# Patient Record
Sex: FEMALE | Race: WHITE | NOT HISPANIC OR LATINO | Employment: OTHER | URBAN - METROPOLITAN AREA
[De-identification: names, ages, dates, MRNs, and addresses within clinical notes are randomized per-mention and may not be internally consistent; named-entity substitution may affect disease eponyms.]

---

## 2018-12-13 ENCOUNTER — HOSPITAL ENCOUNTER (EMERGENCY)
Facility: HOSPITAL | Age: 38
Discharge: HOME | End: 2018-12-13
Attending: EMERGENCY MEDICINE
Payer: MEDICARE

## 2018-12-13 VITALS
DIASTOLIC BLOOD PRESSURE: 49 MMHG | OXYGEN SATURATION: 95 % | HEART RATE: 63 BPM | RESPIRATION RATE: 14 BRPM | SYSTOLIC BLOOD PRESSURE: 95 MMHG | WEIGHT: 197 LBS | TEMPERATURE: 100 F

## 2018-12-13 DIAGNOSIS — J06.9 VIRAL URI WITH COUGH: Primary | ICD-10-CM

## 2018-12-13 LAB
ALBUMIN SERPL BCP-MCNC: 2.9 G/DL
ALP SERPL-CCNC: 102 U/L
ALT SERPL W/O P-5'-P-CCNC: 18 U/L
ANION GAP SERPL CALC-SCNC: 7 MMOL/L
AST SERPL-CCNC: 21 U/L
BASOPHILS # BLD AUTO: 0 K/UL
BASOPHILS NFR BLD: 0.4 %
BILIRUB SERPL-MCNC: 0.3 MG/DL
BUN SERPL-MCNC: 13 MG/DL
CALCIUM SERPL-MCNC: 8.8 MG/DL
CHLORIDE SERPL-SCNC: 106 MMOL/L
CO2 SERPL-SCNC: 27 MMOL/L
CREAT SERPL-MCNC: 0.9 MG/DL
DIFFERENTIAL METHOD: ABNORMAL
EOSINOPHIL # BLD AUTO: 0 K/UL
EOSINOPHIL NFR BLD: 0.9 %
ERYTHROCYTE [DISTWIDTH] IN BLOOD BY AUTOMATED COUNT: 16.5 %
EST. GFR  (AFRICAN AMERICAN): >60 ML/MIN/1.73 M^2
EST. GFR  (NON AFRICAN AMERICAN): >60 ML/MIN/1.73 M^2
FLUAV AG SPEC QL IA: NEGATIVE
FLUBV AG SPEC QL IA: NEGATIVE
GLUCOSE SERPL-MCNC: 85 MG/DL
HCT VFR BLD AUTO: 39.2 %
HGB BLD-MCNC: 13 G/DL
LYMPHOCYTES # BLD AUTO: 0.9 K/UL
LYMPHOCYTES NFR BLD: 19.8 %
MCH RBC QN AUTO: 29 PG
MCHC RBC AUTO-ENTMCNC: 33.2 G/DL
MCV RBC AUTO: 87 FL
MONOCYTES # BLD AUTO: 0.4 K/UL
MONOCYTES NFR BLD: 8.9 %
NEUTROPHILS # BLD AUTO: 3.2 K/UL
NEUTROPHILS NFR BLD: 70 %
PLATELET # BLD AUTO: 201 K/UL
PMV BLD AUTO: 7.4 FL
POTASSIUM SERPL-SCNC: 3.8 MMOL/L
PROT SERPL-MCNC: 7.5 G/DL
RBC # BLD AUTO: 4.49 M/UL
SODIUM SERPL-SCNC: 140 MMOL/L
SPECIMEN SOURCE: NORMAL
WBC # BLD AUTO: 4.6 K/UL

## 2018-12-13 PROCEDURE — 87400 INFLUENZA A/B EACH AG IA: CPT | Mod: 59

## 2018-12-13 PROCEDURE — 85025 COMPLETE CBC W/AUTO DIFF WBC: CPT

## 2018-12-13 PROCEDURE — 96360 HYDRATION IV INFUSION INIT: CPT

## 2018-12-13 PROCEDURE — 99284 EMERGENCY DEPT VISIT MOD MDM: CPT | Mod: 25

## 2018-12-13 PROCEDURE — 80053 COMPREHEN METABOLIC PANEL: CPT

## 2018-12-13 PROCEDURE — 25000003 PHARM REV CODE 250: Performed by: NURSE PRACTITIONER

## 2018-12-13 PROCEDURE — 36415 COLL VENOUS BLD VENIPUNCTURE: CPT

## 2018-12-13 RX ORDER — CHOLECALCIFEROL (VITAMIN D3) 25 MCG
2000 TABLET ORAL DAILY
COMMUNITY
End: 2019-10-10

## 2018-12-13 RX ORDER — LEVOTHYROXINE SODIUM 100 UG/1
100 TABLET ORAL DAILY
COMMUNITY
End: 2019-10-29 | Stop reason: SDUPTHER

## 2018-12-13 RX ORDER — ROSUVASTATIN CALCIUM 10 MG/1
10 TABLET, COATED ORAL DAILY
COMMUNITY
End: 2019-10-10

## 2018-12-13 RX ORDER — GUAIFENESIN/DEXTROMETHORPHAN 100-10MG/5
5 SYRUP ORAL 4 TIMES DAILY PRN
Qty: 120 ML | Refills: 0 | Status: SHIPPED | OUTPATIENT
Start: 2018-12-13 | End: 2018-12-23

## 2018-12-13 RX ORDER — SODIUM CHLORIDE 9 MG/ML
1000 INJECTION, SOLUTION INTRAVENOUS
Status: COMPLETED | OUTPATIENT
Start: 2018-12-13 | End: 2018-12-13

## 2018-12-13 RX ORDER — ACETAMINOPHEN 500 MG
500 TABLET ORAL
Status: COMPLETED | OUTPATIENT
Start: 2018-12-13 | End: 2018-12-13

## 2018-12-13 RX ADMIN — ACETAMINOPHEN 500 MG: 500 TABLET ORAL at 09:12

## 2018-12-13 RX ADMIN — SODIUM CHLORIDE 1000 ML: 0.9 INJECTION, SOLUTION INTRAVENOUS at 09:12

## 2018-12-13 NOTE — ED PROVIDER NOTES
Encounter Date: 12/13/2018       History     Chief Complaint   Patient presents with    Cough     x 2 days     Patient is a 38 y.o. female who presents to the ED 12/13/2018 with a chief complaint of cough for 2 days.  Caregiver reports low-grade fever at home.  Caregiver reports patient has been eating and drinking well.  Caregiver reports patient appears slightly fatigued today and she was up coughing all night.  The patient denies any chest pain or shortness of breath. The caregiver reports concerns for influenza or pneumonia.  Patient denies any lower extremity swelling or history of heart disease.  Patient does have a history of Down syndrome, hypothyroidism, hyperlipidemia, and vitamin-D deficiency.               Review of patient's allergies indicates:  No Known Allergies  History reviewed. No pertinent past medical history.  History reviewed. No pertinent surgical history.  History reviewed. No pertinent family history.  Social History     Tobacco Use    Smoking status: Never Smoker   Substance Use Topics    Alcohol use: Not on file    Drug use: Not on file     Review of Systems   Constitutional: Negative for chills and fever.   HENT: Negative for sore throat.    Respiratory: Positive for cough. Negative for chest tightness and shortness of breath.    Cardiovascular: Negative for chest pain.   Gastrointestinal: Negative for abdominal pain.   Genitourinary: Negative for dysuria.   Musculoskeletal: Negative for arthralgias and myalgias.   Skin: Negative for rash and wound.   Neurological: Negative for syncope.   Hematological: Does not bruise/bleed easily.       Physical Exam     Initial Vitals [12/13/18 0803]   BP Pulse Resp Temp SpO2   (!) 107/57 88 14 99.8 °F (37.7 °C) 96 %      MAP       --         Physical Exam    Nursing note and vitals reviewed.  Constitutional: Vital signs are normal. She appears well-developed and well-nourished.   HENT:   Head: Normocephalic and atraumatic.   Eyes: Pupils are  equal, round, and reactive to light.   Neck: Neck supple.   Cardiovascular: Normal rate, regular rhythm, normal heart sounds and intact distal pulses. Exam reveals no gallop and no friction rub.    No murmur heard.  Pulmonary/Chest: She has no wheezes. She has no rhonchi. She has rales (left lower lobe).   Abdominal: Normal appearance.   Neurological: She is alert and oriented to person, place, and time. She has normal strength.   Skin: Skin is warm, dry and intact.   Psychiatric: She has a normal mood and affect. Her speech is normal and behavior is normal.         ED Course   Procedures  Labs Reviewed   CBC W/ AUTO DIFFERENTIAL - Abnormal; Notable for the following components:       Result Value    RDW 16.5 (*)     MPV 7.4 (*)     Lymph # 0.9 (*)     All other components within normal limits   COMPREHENSIVE METABOLIC PANEL - Abnormal; Notable for the following components:    Albumin 2.9 (*)     Anion Gap 7 (*)     All other components within normal limits   INFLUENZA A AND B ANTIGEN          Imaging Results          X-Ray Chest PA And Lateral (Final result)  Result time 12/13/18 08:53:57    Final result by Johana Spencer MD (12/13/18 08:53:57)                 Impression:      No acute abnormality.      Electronically signed by: Johana Spencer MD  Date:    12/13/2018  Time:    08:53             Narrative:    EXAMINATION:  XR CHEST PA AND LATERAL    CLINICAL HISTORY:  Cough;    TECHNIQUE:  PA and lateral views of the chest were performed.    COMPARISON:  None    FINDINGS:  The lungs are clear, with normal appearance of pulmonary vasculature and no pleural effusion or pneumothorax.    The cardiac silhouette is normal in size. The hilar and mediastinal contours are unremarkable.    Bones are intact.                                 Medical Decision Making:   Differential Diagnosis:   Viral URI  Pneumonia  Influenza       APC / Resident Notes:   Patient is a 38 y.o. female who presents to the ED 12/13/2018 who  underwent emergent evaluation for cough for 2 days.  The patient appears to have a viral upper respiratory infection.  Based upon the history and physical exam the patient does not appear to have a serious bacterial infection such as pneumonia, sepsis, otitis media, bacterial sinusitis, strep pharyngitis, parapharyngeal or peritonsillar abscess, meningitis.  CXR is without acute findings; I do not think pneumonia or bacterial bronchitis. I do not think sepsis. Influenza testing in the ED is negative; I do not think influenza.    Patient appears very well and I have given specific return precautions to the patient and/or family members.  The patient can take over the counter medications and does not appear to need antibiotics at this time. Based on my clinical evaluation, I do not appreciate any immediate, emergent, or life threatening condition or etiology that warrants additional workup today and feel that the patient can be discharged with close follow up care. Case discussed with Dr. Andrade who is agreeable to plan of care. Follow up and return precautions discussed; patient and caregiver verbalized understanding and is agreeable to plan of care. Patient discharged home in stable condition.                   Attending Attestation:     Physician Attestation Statement for NP/PA:   I discussed this assessment and plan of this patient with the NP/PA, but I did not personally examine the patient. The face to face encounter was performed by the NP/PA.    Other NP/PA Attestation Additions:    History of Present Illness: 38-year-old female presented with a chief complaint of a cough.    Medical Decision Making: Initial differential diagnosis included but not limited to pneumonia, bronchitis, and upper respiratory infection.  I am in agreement with the nurse practitioner's assessment, treatment, and plan of care.       Physician Attestation for Scribe:  Physician Attestation Statement for Scribe #1: Meseret DUARTE,  reviewed documentation, as scribed by in my presence, and it is both accurate and complete.     Comments: I, LUIS Hernandez, personally performed the services described in this documentation. All medical record entries made by the scribe were at my direction and in my presence.  I have reviewed the chart and agree that the record reflects my personal performance and is accurate and complete. LUIS Hernandez.  4:30 PM 12/13/2018     I, Dr. Bradford Andrade, personally performed the services described in this documentation. All medical record entries made by the scribe were at my direction and in my presence.  I have reviewed the chart and agree that the record reflects my personal performance and is accurate and complete. Bradford Andrade MD.  4:38 PM 12/13/2018             Clinical Impression:   The encounter diagnosis was Viral URI with cough.      Disposition:   Disposition: Discharged  Condition: Stable                        Meseret Gates NP  12/13/18 1630       Bradford Andrade MD  12/13/18 0142

## 2019-10-10 ENCOUNTER — TELEPHONE (OUTPATIENT)
Dept: FAMILY MEDICINE | Facility: CLINIC | Age: 39
End: 2019-10-10

## 2019-10-10 ENCOUNTER — OFFICE VISIT (OUTPATIENT)
Dept: FAMILY MEDICINE | Facility: CLINIC | Age: 39
End: 2019-10-10
Payer: MEDICARE

## 2019-10-10 VITALS
HEART RATE: 68 BPM | TEMPERATURE: 98 F | WEIGHT: 215 LBS | BODY MASS INDEX: 45.13 KG/M2 | DIASTOLIC BLOOD PRESSURE: 70 MMHG | HEIGHT: 58 IN | SYSTOLIC BLOOD PRESSURE: 98 MMHG

## 2019-10-10 DIAGNOSIS — L73.9 FOLLICULITIS: Primary | ICD-10-CM

## 2019-10-10 DIAGNOSIS — Q90.9 DOWN SYNDROME: ICD-10-CM

## 2019-10-10 PROCEDURE — 99213 PR OFFICE/OUTPT VISIT, EST, LEVL III, 20-29 MIN: ICD-10-PCS | Mod: S$GLB,,, | Performed by: FAMILY MEDICINE

## 2019-10-10 PROCEDURE — 99213 OFFICE O/P EST LOW 20 MIN: CPT | Mod: S$GLB,,, | Performed by: FAMILY MEDICINE

## 2019-10-10 RX ORDER — ROSUVASTATIN CALCIUM 20 MG/1
1 TABLET, COATED ORAL DAILY
Refills: 3 | COMMUNITY
Start: 2019-08-13 | End: 2020-08-18 | Stop reason: SDUPTHER

## 2019-10-10 RX ORDER — TRIAMCINOLONE ACETONIDE 5 MG/G
1 OINTMENT TOPICAL 2 TIMES DAILY
COMMUNITY
End: 2020-04-28 | Stop reason: SDUPTHER

## 2019-10-10 RX ORDER — LORATADINE 10 MG/1
1 TABLET ORAL DAILY
COMMUNITY

## 2019-10-10 RX ORDER — DOXYCYCLINE 100 MG/1
100 CAPSULE ORAL 2 TIMES DAILY
Qty: 20 CAPSULE | Refills: 0 | Status: SHIPPED | OUTPATIENT
Start: 2019-10-10 | End: 2019-10-20

## 2019-10-10 RX ORDER — FLUTICASONE PROPIONATE 50 MCG
1 SPRAY, SUSPENSION (ML) NASAL 2 TIMES DAILY
COMMUNITY
End: 2020-11-12 | Stop reason: SDUPTHER

## 2019-10-10 RX ORDER — MUPIROCIN 20 MG/G
OINTMENT TOPICAL 2 TIMES DAILY
Qty: 30 G | Refills: 3 | Status: SHIPPED | OUTPATIENT
Start: 2019-10-10 | End: 2020-11-12 | Stop reason: SDUPTHER

## 2019-10-10 RX ORDER — MUPIROCIN 20 MG/G
1 OINTMENT TOPICAL 2 TIMES DAILY
COMMUNITY
Start: 2016-02-02 | End: 2019-10-10 | Stop reason: SDUPTHER

## 2019-10-10 RX ORDER — ACETAMINOPHEN 500 MG
1 TABLET ORAL DAILY
COMMUNITY

## 2019-10-10 NOTE — TELEPHONE ENCOUNTER
----- Message from Lizz Sanchez sent at 10/10/2019  9:03 AM CDT -----  Contact: Gloria patient's mother  Boil under her breast has ruptured and she thinks it has turned into a staff infection now, can dr. Mariee possibly see her today??   Gloria's# [phone]

## 2019-10-11 NOTE — PROGRESS NOTES
SUBJECTIVE:    Patient ID: Catalina Lovett is a 39 y.o. female.    Chief Complaint: boils under breast    Patient with history of Down syndrome and hypothyroidism is brought in by her mother with complaints of boils under her breasts.  Patient has had previous problems with intertrigo and folliculitis as well as eczema.  She uses Bactroban ointment periodically which she has some irritation.  Mother reports that 2 days ago she noted several red lesions under her breasts that were draining pus.  Patient does not complain much of pain.  She has been compliant with her other medications.      Past Medical History:   Diagnosis Date    Down's syndrome     Hyperlipidemia     Hypothyroidism      Past Surgical History:   Procedure Laterality Date    TEAR DUCT SURGERY       Family History   Problem Relation Age of Onset    Hypertension Mother     Diabetes Mother        Marital Status: Single  Alcohol History:  reports that she does not drink alcohol.  Tobacco History:  reports that she has never smoked. She has never used smokeless tobacco.  Drug History:  reports that she does not use drugs.    Review of patient's allergies indicates:  No Known Allergies    Current Outpatient Medications:     mupirocin (BACTROBAN) 2 % ointment, Apply topically 2 (two) times daily., Disp: 30 g, Rfl: 3    cholecalciferol, vitamin D3, (VITAMIN D3) 2,000 unit Cap, Take 1 capsule by mouth Daily., Disp: , Rfl:     doxycycline (VIBRAMYCIN) 100 MG Cap, Take 1 capsule (100 mg total) by mouth 2 (two) times daily. for 10 days, Disp: 20 capsule, Rfl: 0    fluticasone propionate (FLONASE) 50 mcg/actuation nasal spray, 1 spray by Each Nostril route 2 (two) times daily., Disp: , Rfl:     levothyroxine (SYNTHROID) 100 MCG tablet, Take 100 mcg by mouth once daily., Disp: , Rfl:     loratadine (CLARITIN) 10 mg tablet, Take 1 tablet by mouth Daily., Disp: , Rfl:     rosuvastatin (CRESTOR) 20 MG tablet, Take 1 tablet by mouth Daily., Disp: ,  "Rfl: 3    triamcinolone (KENALOG) 0.5 % ointment, Apply 1 application topically 2 (two) times daily. For rash, Disp: , Rfl:     Review of Systems   Constitutional: Negative for activity change, fatigue and unexpected weight change.   HENT: Negative for hearing loss, postnasal drip, sinus pressure, sore throat and voice change.    Eyes: Negative for photophobia and visual disturbance.   Respiratory: Negative for cough, shortness of breath and wheezing.    Cardiovascular: Negative for chest pain and palpitations.   Gastrointestinal: Negative for constipation, diarrhea and nausea.   Genitourinary: Negative for difficulty urinating, frequency, hematuria and urgency.   Musculoskeletal: Negative for arthralgias and back pain.   Neurological: Negative for weakness, light-headedness and headaches.   Hematological: Negative for adenopathy. Does not bruise/bleed easily.   Psychiatric/Behavioral: The patient is not nervous/anxious.           Objective:      Vitals:    10/10/19 1654   BP: 98/70   Pulse: 68   Temp: 98 °F (36.7 °C)   Weight: 97.5 kg (215 lb)   Height: 4' 9.5" (1.461 m)     Physical Exam   Constitutional: She appears well-developed and well-nourished. No distress.   Overweight   HENT:   Down's facies   Eyes: Pupils are equal, round, and reactive to light. EOM are normal.   Cardiovascular: Normal rate.   Pulmonary/Chest: Effort normal and breath sounds normal.   Abdominal: Bowel sounds are normal.   Skin:   Erythematous pustules and different stages under bilateral breasts.  Right worse than left.   Vitals reviewed.        Assessment:       1. Folliculitis    2. Down syndrome         Plan:       Folliculitis  -     mupirocin (BACTROBAN) 2 % ointment; Apply topically 2 (two) times daily.  Dispense: 30 g; Refill: 3  -     doxycycline (VIBRAMYCIN) 100 MG Cap; Take 1 capsule (100 mg total) by mouth 2 (two) times daily. for 10 days  Dispense: 20 capsule; Refill: 0    Down syndrome     Known care instructions provided " to mother.  Patient also to apply Bactroban ointment inside her nose x5 days to reduce bacterial counts.  Follow up if symptoms worsen or fail to improve.

## 2019-10-29 ENCOUNTER — CLINICAL SUPPORT (OUTPATIENT)
Dept: FAMILY MEDICINE | Facility: CLINIC | Age: 39
End: 2019-10-29
Payer: MEDICARE

## 2019-10-29 VITALS — TEMPERATURE: 98 F

## 2019-10-29 DIAGNOSIS — Z23 FLU VACCINE NEED: Primary | ICD-10-CM

## 2019-10-29 DIAGNOSIS — E03.9 HYPOTHYROIDISM, UNSPECIFIED TYPE: Primary | ICD-10-CM

## 2019-10-29 PROCEDURE — G0008 FLU VACCINE - QUADRIVALENT (RECOMBINANT) PRESERVATIVE FREE: ICD-10-PCS | Mod: S$GLB,,, | Performed by: FAMILY MEDICINE

## 2019-10-29 PROCEDURE — G0008 ADMIN INFLUENZA VIRUS VAC: HCPCS | Mod: S$GLB,,, | Performed by: FAMILY MEDICINE

## 2019-10-29 PROCEDURE — 90682 FLU VACCINE - QUADRIVALENT (RECOMBINANT) PRESERVATIVE FREE: ICD-10-PCS | Mod: S$GLB,,, | Performed by: FAMILY MEDICINE

## 2019-10-29 PROCEDURE — 90682 RIV4 VACC RECOMBINANT DNA IM: CPT | Mod: S$GLB,,, | Performed by: FAMILY MEDICINE

## 2019-10-29 RX ORDER — LEVOTHYROXINE SODIUM 100 UG/1
100 TABLET ORAL DAILY
Qty: 90 TABLET | Refills: 1 | Status: SHIPPED | OUTPATIENT
Start: 2019-10-29 | End: 2020-04-08 | Stop reason: SDUPTHER

## 2019-10-29 NOTE — TELEPHONE ENCOUNTER
----- Message from Steff Bello sent at 10/29/2019 11:35 AM CDT -----  Contact: Gloria pts mom   Refill levothyroxine. She is almost out. [facility] on [address]. Gloria # [phone] GH

## 2019-10-30 LAB — TSH SERPL-ACNC: 1.22 MIU/L

## 2019-11-05 ENCOUNTER — OFFICE VISIT (OUTPATIENT)
Dept: FAMILY MEDICINE | Facility: CLINIC | Age: 39
End: 2019-11-05
Payer: MEDICARE

## 2019-11-05 VITALS
WEIGHT: 214 LBS | SYSTOLIC BLOOD PRESSURE: 106 MMHG | HEIGHT: 57 IN | DIASTOLIC BLOOD PRESSURE: 66 MMHG | HEART RATE: 72 BPM | BODY MASS INDEX: 46.17 KG/M2

## 2019-11-05 DIAGNOSIS — L20.84 INTRINSIC ECZEMA: ICD-10-CM

## 2019-11-05 DIAGNOSIS — Q90.9 DOWN SYNDROME: ICD-10-CM

## 2019-11-05 DIAGNOSIS — E03.9 ACQUIRED HYPOTHYROIDISM: Primary | ICD-10-CM

## 2019-11-05 DIAGNOSIS — E55.9 VITAMIN D DEFICIENCY: ICD-10-CM

## 2019-11-05 DIAGNOSIS — E78.49 OTHER HYPERLIPIDEMIA: ICD-10-CM

## 2019-11-05 PROCEDURE — 99214 PR OFFICE/OUTPT VISIT, EST, LEVL IV, 30-39 MIN: ICD-10-PCS | Mod: S$GLB,,, | Performed by: FAMILY MEDICINE

## 2019-11-05 PROCEDURE — 99214 OFFICE O/P EST MOD 30 MIN: CPT | Mod: S$GLB,,, | Performed by: FAMILY MEDICINE

## 2019-11-05 NOTE — PROGRESS NOTES
Dictation #1  MRN:[id number]  [facility]:[id number]    SUBJECTIVE:    Patient ID: Catalina Lovett is a 39 y.o. female.    Chief Complaint: Regular Check Up    Patient with hypothyroidism, hyperlipidemia and Down syndrome is brought in by her mother for regular visit.  She has been doing well on current medications.  On last check her TSH was slightly abnormal dosing was not changed.  TSH is normal today.  Blood pressure is good.  Some weight gain is noted. Patient does have a habit of over eating.  She has some trouble with eczema at the nail beds and frequently picks at her nail beds and gets infection.  No infections currently but does have irritation.    Orders Only on 10/29/2019   Component Date Value Ref Range Status    TSH w/reflex to FT4 10/29/2019 1.22  mIU/L Final     Past Medical History:   Diagnosis Date    Down's syndrome     Hyperlipidemia     Hypothyroidism      Past Surgical History:   Procedure Laterality Date    TEAR DUCT SURGERY       Family History   Problem Relation Age of Onset    Hypertension Mother     Diabetes Mother        Marital Status: Single  Alcohol History:  reports that she does not drink alcohol.  Tobacco History:  reports that she has never smoked. She has never used smokeless tobacco.  Drug History:  reports that she does not use drugs.    Review of patient's allergies indicates:  No Known Allergies    Current Outpatient Medications:     cholecalciferol, vitamin D3, (VITAMIN D3) 2,000 unit Cap, Take 1 capsule by mouth Daily., Disp: , Rfl:     fluticasone propionate (FLONASE) 50 mcg/actuation nasal spray, 1 spray by Each Nostril route 2 (two) times daily., Disp: , Rfl:     levothyroxine (SYNTHROID) 100 MCG tablet, Take 1 tablet (100 mcg total) by mouth once daily., Disp: 90 tablet, Rfl: 1    loratadine (CLARITIN) 10 mg tablet, Take 1 tablet by mouth Daily., Disp: , Rfl:     mupirocin (BACTROBAN) 2 % ointment, Apply topically 2 (two) times daily., Disp: 30 g, Rfl: 3     "rosuvastatin (CRESTOR) 20 MG tablet, Take 1 tablet by mouth Daily., Disp: , Rfl: 3    triamcinolone (KENALOG) 0.5 % ointment, Apply 1 application topically 2 (two) times daily. For rash, Disp: , Rfl:     Review of Systems   Constitutional: Negative for activity change, fatigue and unexpected weight change.   HENT: Negative for hearing loss, postnasal drip, sinus pressure, sore throat and voice change.    Eyes: Negative for photophobia and visual disturbance.   Respiratory: Negative for cough, shortness of breath and wheezing.    Cardiovascular: Negative for chest pain and palpitations.   Gastrointestinal: Negative for constipation, diarrhea and nausea.   Genitourinary: Negative for difficulty urinating, frequency, hematuria and urgency.   Musculoskeletal: Negative for arthralgias and back pain.   Skin: Negative for rash.   Neurological: Negative for weakness, light-headedness and headaches.   Hematological: Negative for adenopathy. Does not bruise/bleed easily.   Psychiatric/Behavioral: The patient is not nervous/anxious.           Objective:      Vitals:    11/05/19 0847   BP: 106/66   Pulse: 72   Weight: 97.1 kg (214 lb)   Height: 4' 9" (1.448 m)     Physical Exam   Constitutional: She is oriented to person, place, and time. Vital signs are normal. She appears well-developed and well-nourished. No distress.   Down's features   HENT:   Head: Normocephalic and atraumatic.   Right Ear: Tympanic membrane and external ear normal.   Left Ear: Tympanic membrane and external ear normal.   Eyes: Pupils are equal, round, and reactive to light. Conjunctivae, EOM and lids are normal.   Neck: Full passive range of motion without pain. Neck supple. No JVD present. No tracheal deviation present. No thyromegaly present.   Cardiovascular: Normal rate and regular rhythm. PMI is not displaced.   Pulmonary/Chest: Effort normal and breath sounds normal.   Abdominal: Soft. Bowel sounds are normal. There is no hepatosplenomegaly. There " is no tenderness. There is no rebound and no guarding.   Musculoskeletal: Normal range of motion. She exhibits no edema or tenderness.   Neurological: She is alert and oriented to person, place, and time.   Skin: Skin is warm and dry. No rash noted.   Dryness and scaling and nail beds of bilateral fingers   Psychiatric: She has a normal mood and affect.   Vitals reviewed.        Assessment:       1. Acquired hypothyroidism    2. Other hyperlipidemia    3. Down syndrome    4. Vitamin D deficiency    5. Intrinsic eczema         Plan:       Acquired hypothyroidism  -     CBC auto differential; Future; Expected date: 04/01/2020  -     TSH w/reflex to FT4; Future; Expected date: 04/01/2020    Other hyperlipidemia  -     Comprehensive metabolic panel; Future; Expected date: 04/01/2020  -     Lipid panel; Future; Expected date: 04/01/2020    Down syndrome  -     Urinalysis, Reflex to Urine Culture Urine, Clean Catch; Future; Expected date: 04/01/2020    Vitamin D deficiency  -     Vitamin D; Future; Expected date: 04/01/2020    Intrinsic eczema      No follow-ups on file.

## 2020-04-08 DIAGNOSIS — E03.9 HYPOTHYROIDISM, UNSPECIFIED TYPE: ICD-10-CM

## 2020-04-08 RX ORDER — LEVOTHYROXINE SODIUM 100 UG/1
100 TABLET ORAL DAILY
Qty: 90 TABLET | Refills: 1 | Status: SHIPPED | OUTPATIENT
Start: 2020-04-08 | End: 2020-10-30 | Stop reason: SDUPTHER

## 2020-04-08 NOTE — TELEPHONE ENCOUNTER
----- Message from Nina Guthrie sent at 4/8/2020  4:19 PM CDT -----  Contact: Gloria  Refill for levothyroxine. [facility]. Gloria @[phone]

## 2020-04-28 ENCOUNTER — TELEPHONE (OUTPATIENT)
Dept: FAMILY MEDICINE | Facility: CLINIC | Age: 40
End: 2020-04-28

## 2020-04-28 DIAGNOSIS — L73.9 FOLLICULITIS: ICD-10-CM

## 2020-04-28 NOTE — TELEPHONE ENCOUNTER
LMOR that fasting lab is due prior to ov, if she feels comfortable, and that orders are at [facility] and she would need to go to [address] [facility].

## 2020-04-28 NOTE — TELEPHONE ENCOUNTER
----- Message from Sterling Sanchez sent at 4/28/2020  3:07 PM CDT -----  Refills on ointment for fingers   [facility]   Pt [phone]

## 2020-04-29 RX ORDER — TRIAMCINOLONE ACETONIDE 5 MG/G
1 OINTMENT TOPICAL 2 TIMES DAILY
Qty: 30 G | Refills: 1 | Status: SHIPPED | OUTPATIENT
Start: 2020-04-29 | End: 2021-06-01 | Stop reason: SDUPTHER

## 2020-06-03 ENCOUNTER — PES CALL (OUTPATIENT)
Dept: ADMINISTRATIVE | Facility: CLINIC | Age: 40
End: 2020-06-03

## 2020-06-26 ENCOUNTER — PES CALL (OUTPATIENT)
Dept: ADMINISTRATIVE | Facility: CLINIC | Age: 40
End: 2020-06-26

## 2020-07-15 LAB
25(OH)D3 SERPL-MCNC: 41 NG/ML (ref 30–100)
ALBUMIN SERPL-MCNC: 3.6 G/DL (ref 3.6–5.1)
ALBUMIN/GLOB SERPL: 1 (CALC) (ref 1–2.5)
ALP SERPL-CCNC: 106 U/L (ref 31–125)
ALT SERPL-CCNC: 12 U/L (ref 6–29)
APPEARANCE UR: CLEAR
AST SERPL-CCNC: 17 U/L (ref 10–30)
BACTERIA #/AREA URNS HPF: NORMAL /HPF
BACTERIA UR CULT: NORMAL
BASOPHILS # BLD AUTO: 62 CELLS/UL (ref 0–200)
BASOPHILS NFR BLD AUTO: 1.2 %
BILIRUB SERPL-MCNC: 0.4 MG/DL (ref 0.2–1.2)
BILIRUB UR QL STRIP: NEGATIVE
BUN SERPL-MCNC: 13 MG/DL (ref 7–25)
BUN/CREAT SERPL: NORMAL (CALC) (ref 6–22)
CALCIUM SERPL-MCNC: 8.8 MG/DL (ref 8.6–10.2)
CHLORIDE SERPL-SCNC: 106 MMOL/L (ref 98–110)
CHOLEST SERPL-MCNC: 155 MG/DL
CHOLEST/HDLC SERPL: 3.4 (CALC)
CO2 SERPL-SCNC: 29 MMOL/L (ref 20–32)
COLOR UR: YELLOW
CREAT SERPL-MCNC: 1.07 MG/DL (ref 0.5–1.1)
EOSINOPHIL # BLD AUTO: 31 CELLS/UL (ref 15–500)
EOSINOPHIL NFR BLD AUTO: 0.6 %
ERYTHROCYTE [DISTWIDTH] IN BLOOD BY AUTOMATED COUNT: 15.2 % (ref 11–15)
GFRSERPLBLD MDRD-ARVRAT: 65 ML/MIN/1.73M2
GLOBULIN SER CALC-MCNC: 3.7 G/DL (CALC) (ref 1.9–3.7)
GLUCOSE SERPL-MCNC: 83 MG/DL (ref 65–99)
GLUCOSE UR QL STRIP: NEGATIVE
HCT VFR BLD AUTO: 44.2 % (ref 35–45)
HDLC SERPL-MCNC: 45 MG/DL
HGB BLD-MCNC: 13.8 G/DL (ref 11.7–15.5)
HGB UR QL STRIP: NEGATIVE
HYALINE CASTS #/AREA URNS LPF: NORMAL /LPF
KETONES UR QL STRIP: NEGATIVE
LDLC SERPL CALC-MCNC: 92 MG/DL (CALC)
LEUKOCYTE ESTERASE UR QL STRIP: NEGATIVE
LYMPHOCYTES # BLD AUTO: 1612 CELLS/UL (ref 850–3900)
LYMPHOCYTES NFR BLD AUTO: 31 %
MCH RBC QN AUTO: 28.4 PG (ref 27–33)
MCHC RBC AUTO-ENTMCNC: 31.2 G/DL (ref 32–36)
MCV RBC AUTO: 90.9 FL (ref 80–100)
MONOCYTES # BLD AUTO: 385 CELLS/UL (ref 200–950)
MONOCYTES NFR BLD AUTO: 7.4 %
NEUTROPHILS # BLD AUTO: 3110 CELLS/UL (ref 1500–7800)
NEUTROPHILS NFR BLD AUTO: 59.8 %
NITRITE UR QL STRIP: NEGATIVE
NONHDLC SERPL-MCNC: 110 MG/DL (CALC)
PH UR STRIP: NORMAL [PH] (ref 5–8)
PLATELET # BLD AUTO: 228 THOUSAND/UL (ref 140–400)
PMV BLD REES-ECKER: 9.8 FL (ref 7.5–12.5)
POTASSIUM SERPL-SCNC: 4.3 MMOL/L (ref 3.5–5.3)
PROT SERPL-MCNC: 7.3 G/DL (ref 6.1–8.1)
PROT UR QL STRIP: NEGATIVE
RBC # BLD AUTO: 4.86 MILLION/UL (ref 3.8–5.1)
RBC #/AREA URNS HPF: NORMAL /HPF
SODIUM SERPL-SCNC: 141 MMOL/L (ref 135–146)
SP GR UR STRIP: 1.02 (ref 1–1.03)
SQUAMOUS #/AREA URNS HPF: NORMAL /HPF
TRIGL SERPL-MCNC: 85 MG/DL
TSH SERPL-ACNC: 2.18 MIU/L
WBC # BLD AUTO: 5.2 THOUSAND/UL (ref 3.8–10.8)
WBC #/AREA URNS HPF: NORMAL /HPF

## 2020-08-06 ENCOUNTER — TELEPHONE (OUTPATIENT)
Dept: FAMILY MEDICINE | Facility: CLINIC | Age: 40
End: 2020-08-06

## 2020-08-06 NOTE — TELEPHONE ENCOUNTER
----- Message from Sterling Sanchez sent at 8/6/2020  9:07 AM CDT -----  Regarding: needing call back  PT mother is calling for pt bloodwork results   Pt mother Gloria  [phone]

## 2020-08-10 ENCOUNTER — TELEPHONE (OUTPATIENT)
Dept: FAMILY MEDICINE | Facility: CLINIC | Age: 40
End: 2020-08-10

## 2020-08-10 NOTE — TELEPHONE ENCOUNTER
Spoke to patient's mother, Gloria, who understood the message in previous encounter that all labs are good and she is to continue current therapy/ba

## 2020-08-18 DIAGNOSIS — E78.49 OTHER HYPERLIPIDEMIA: Primary | ICD-10-CM

## 2020-08-18 NOTE — TELEPHONE ENCOUNTER
----- Message from Sterling Sanchez sent at 8/18/2020 12:32 PM CDT -----  Regarding: refills/needing call back  Cholesterol   [facility]   Pt [phone]    Pt mother wants to know about her d3 levels

## 2020-08-20 RX ORDER — ROSUVASTATIN CALCIUM 20 MG/1
20 TABLET, COATED ORAL DAILY
Qty: 90 TABLET | Refills: 1 | Status: SHIPPED | OUTPATIENT
Start: 2020-08-20 | End: 2020-11-12 | Stop reason: SDUPTHER

## 2020-08-31 ENCOUNTER — TELEPHONE (OUTPATIENT)
Dept: FAMILY MEDICINE | Facility: CLINIC | Age: 40
End: 2020-08-31

## 2020-08-31 NOTE — TELEPHONE ENCOUNTER
----- Message from Nina Guthrie sent at 8/31/2020  2:43 PM CDT -----  Contact: Gloria Castro states that the battery has been leaking acis for over a week now and they have inhaled some fumes from it. Pt has a mild cough. Please advise.  Gloria @[phone]

## 2020-09-02 NOTE — TELEPHONE ENCOUNTER
What kind of battery acid was inhaled and for how long. Were they in a closed spaced. Are they having fever or SOB. I suspect they are going to be ok but I need more info

## 2020-10-30 DIAGNOSIS — E03.9 HYPOTHYROIDISM, UNSPECIFIED TYPE: ICD-10-CM

## 2020-10-30 RX ORDER — LEVOTHYROXINE SODIUM 100 UG/1
100 TABLET ORAL DAILY
Qty: 90 TABLET | Refills: 1 | Status: SHIPPED | OUTPATIENT
Start: 2020-10-30 | End: 2021-05-10 | Stop reason: SDUPTHER

## 2020-11-06 ENCOUNTER — TELEPHONE (OUTPATIENT)
Dept: FAMILY MEDICINE | Facility: CLINIC | Age: 40
End: 2020-11-06

## 2020-11-06 NOTE — TELEPHONE ENCOUNTER
----- Message from Lizz Sanchez sent at 11/6/2020 11:45 AM CST -----  Regarding: Schedule appt  Contact: Catalinagucci Lovett  Mom says that the patient has been complaining about her left leg hurting and she would like to get her scheduled for an appt some time next week in the evening if possible. She says she can do any day excepts Wednesday . Please give Gloria a call back # [phone]

## 2020-11-12 ENCOUNTER — OFFICE VISIT (OUTPATIENT)
Dept: FAMILY MEDICINE | Facility: CLINIC | Age: 40
End: 2020-11-12
Payer: MEDICARE

## 2020-11-12 VITALS
WEIGHT: 210 LBS | HEIGHT: 57 IN | BODY MASS INDEX: 45.3 KG/M2 | DIASTOLIC BLOOD PRESSURE: 64 MMHG | HEART RATE: 87 BPM | SYSTOLIC BLOOD PRESSURE: 98 MMHG | TEMPERATURE: 98 F

## 2020-11-12 DIAGNOSIS — E03.9 ACQUIRED HYPOTHYROIDISM: Primary | ICD-10-CM

## 2020-11-12 DIAGNOSIS — L20.84 INTRINSIC ECZEMA: ICD-10-CM

## 2020-11-12 DIAGNOSIS — L70.8 OTHER ACNE: ICD-10-CM

## 2020-11-12 DIAGNOSIS — J30.2 SEASONAL ALLERGIC RHINITIS, UNSPECIFIED TRIGGER: ICD-10-CM

## 2020-11-12 DIAGNOSIS — L73.9 FOLLICULITIS: ICD-10-CM

## 2020-11-12 DIAGNOSIS — E55.9 VITAMIN D DEFICIENCY: ICD-10-CM

## 2020-11-12 DIAGNOSIS — R30.0 DYSURIA: ICD-10-CM

## 2020-11-12 DIAGNOSIS — E78.49 OTHER HYPERLIPIDEMIA: ICD-10-CM

## 2020-11-12 PROCEDURE — 99214 PR OFFICE/OUTPT VISIT, EST, LEVL IV, 30-39 MIN: ICD-10-PCS | Mod: S$GLB,,, | Performed by: FAMILY MEDICINE

## 2020-11-12 PROCEDURE — 99214 OFFICE O/P EST MOD 30 MIN: CPT | Mod: S$GLB,,, | Performed by: FAMILY MEDICINE

## 2020-11-12 RX ORDER — FLUTICASONE PROPIONATE 50 MCG
1 SPRAY, SUSPENSION (ML) NASAL 2 TIMES DAILY
Qty: 16 G | Refills: 5 | Status: SHIPPED | OUTPATIENT
Start: 2020-11-12 | End: 2021-06-01

## 2020-11-12 RX ORDER — MUPIROCIN 20 MG/G
OINTMENT TOPICAL 2 TIMES DAILY
Qty: 30 G | Refills: 3 | Status: SHIPPED | OUTPATIENT
Start: 2020-11-12

## 2020-11-12 RX ORDER — ROSUVASTATIN CALCIUM 20 MG/1
20 TABLET, COATED ORAL DAILY
Qty: 90 TABLET | Refills: 1 | Status: SHIPPED | OUTPATIENT
Start: 2020-11-12 | End: 2021-06-01 | Stop reason: SDUPTHER

## 2020-11-12 NOTE — PROGRESS NOTES
SUBJECTIVE:    Patient ID: Catalina Lovett is a 40 y.o. female.    Chief Complaint: Regular Check Up and flu vaccine complete    Patient with history of hypothyroidism, hyperlipidemia and down syndrome is brought in for visit with her mother.  Her regular visit with me was delayed secondary to COVID-19 situation.  She has continued to medications.  And they have been staying home mostly to avoid COVID-19 exposure.  Blood pressure is well controlled.  Mild weight loss since her last visit 1 year ago.  Up-to-date with flu vaccine  menstral cycles regular. Dentist and eye exam pending. Notes some rash to face       No visits with results within 6 Month(s) from this visit.   Latest known visit with results is:   Office Visit on 11/05/2019   Component Date Value Ref Range Status    WBC 07/14/2020 5.2  3.8 - 10.8 Thousand/uL Final    RBC 07/14/2020 4.86  3.80 - 5.10 Million/uL Final    Hemoglobin 07/14/2020 13.8  11.7 - 15.5 g/dL Final    Hematocrit 07/14/2020 44.2  35.0 - 45.0 % Final    MCV 07/14/2020 90.9  80.0 - 100.0 fL Final    MCH 07/14/2020 28.4  27.0 - 33.0 pg Final    MCHC 07/14/2020 31.2* 32.0 - 36.0 g/dL Final    RDW 07/14/2020 15.2* 11.0 - 15.0 % Final    Platelets 07/14/2020 228  140 - 400 Thousand/uL Final    MPV 07/14/2020 9.8  7.5 - 12.5 fL Final    Neutrophils Absolute 07/14/2020 3,110  1,500 - 7,800 cells/uL Final    Lymph # 07/14/2020 1,612  850 - 3,900 cells/uL Final    Mono # 07/14/2020 385  200 - 950 cells/uL Final    Eos # 07/14/2020 31  15 - 500 cells/uL Final    Baso # 07/14/2020 62  0 - 200 cells/uL Final    Neutrophils Relative 07/14/2020 59.8  % Final    Lymph % 07/14/2020 31.0  % Final    Mono % 07/14/2020 7.4  % Final    Eosinophil % 07/14/2020 0.6  % Final    Basophil % 07/14/2020 1.2  % Final    Glucose 07/14/2020 83  65 - 99 mg/dL Final    BUN 07/14/2020 13  7 - 25 mg/dL Final    Creatinine 07/14/2020 1.07  0.50 - 1.10 mg/dL Final    eGFR if non African  American 07/14/2020 65  > OR = 60 mL/min/1.73m2 Final    eGFR if African American 07/14/2020 76  > OR = 60 mL/min/1.73m2 Final    BUN/Creatinine Ratio 07/14/2020 NOT APPLICABLE  6 - 22 (calc) Final    Sodium 07/14/2020 141  135 - 146 mmol/L Final    Potassium 07/14/2020 4.3  3.5 - 5.3 mmol/L Final    Chloride 07/14/2020 106  98 - 110 mmol/L Final    CO2 07/14/2020 29  20 - 32 mmol/L Final    Calcium 07/14/2020 8.8  8.6 - 10.2 mg/dL Final    Total Protein 07/14/2020 7.3  6.1 - 8.1 g/dL Final    Albumin 07/14/2020 3.6  3.6 - 5.1 g/dL Final    Globulin, Total 07/14/2020 3.7  1.9 - 3.7 g/dL (calc) Final    Albumin/Globulin Ratio 07/14/2020 1.0  1.0 - 2.5 (calc) Final    Total Bilirubin 07/14/2020 0.4  0.2 - 1.2 mg/dL Final    Alkaline Phosphatase 07/14/2020 106  31 - 125 U/L Final    AST 07/14/2020 17  10 - 30 U/L Final    ALT 07/14/2020 12  6 - 29 U/L Final    Cholesterol 07/14/2020 155  <200 mg/dL Final    HDL 07/14/2020 45* > OR = 50 mg/dL Final    Triglycerides 07/14/2020 85  <150 mg/dL Final    LDL Cholesterol 07/14/2020 92  mg/dL (calc) Final    HDL/Cholesterol Ratio 07/14/2020 3.4  <5.0 (calc) Final    Non HDL Chol. (LDL+VLDL) 07/14/2020 110  <130 mg/dL (calc) Final    TSH w/reflex to FT4 07/14/2020 2.18  mIU/L Final    Color, UA 07/14/2020 YELLOW  YELLOW Final    Appearance, UA 07/14/2020 CLEAR  CLEAR Final    Specific Gravity, UA 07/14/2020 1.018  1.001 - 1.035 Final    pH, UA 07/14/2020 < OR = 5.0  5.0 - 8.0 Final    Glucose, UA 07/14/2020 NEGATIVE  NEGATIVE Final    Bilirubin, UA 07/14/2020 NEGATIVE  NEGATIVE Final    Ketones, UA 07/14/2020 NEGATIVE  NEGATIVE Final    Occult Blood UA 07/14/2020 NEGATIVE  NEGATIVE Final    Protein, UA 07/14/2020 NEGATIVE  NEGATIVE Final    Nitrite, UA 07/14/2020 NEGATIVE  NEGATIVE Final    Leukocytes, UA 07/14/2020 NEGATIVE  NEGATIVE Final    WBC Casts, UA 07/14/2020 NONE SEEN  < OR = 5 /HPF Final    RBC Casts, UA 07/14/2020 NONE SEEN  < OR =  2 /HPF Final    Squam Epithel, UA 07/14/2020 0-5  < OR = 5 /HPF Final    Bacteria, UA 07/14/2020 NONE SEEN  NONE SEEN /HPF Final    Hyaline Casts, UA 07/14/2020 NONE SEEN  NONE SEEN /LPF Final    Reflexive Urine Culture 07/14/2020 NO CULTURE INDICATED   Final    Vitamin D, 25-OH, Total 07/14/2020 41  30 - 100 ng/mL Final       Past Medical History:   Diagnosis Date    Down's syndrome     Hyperlipidemia     Hypothyroidism      Past Surgical History:   Procedure Laterality Date    TEAR DUCT SURGERY       Family History   Problem Relation Age of Onset    Hypertension Mother     Diabetes Mother        Marital Status: Single  Alcohol History:  reports no history of alcohol use.  Tobacco History:  reports that she has never smoked. She has never used smokeless tobacco.  Drug History:  reports no history of drug use.    Review of patient's allergies indicates:  No Known Allergies    Current Outpatient Medications:     cholecalciferol, vitamin D3, (VITAMIN D3) 2,000 unit Cap, Take 1 capsule by mouth Daily., Disp: , Rfl:     fluticasone propionate (FLONASE) 50 mcg/actuation nasal spray, 1 spray (50 mcg total) by Each Nostril route 2 (two) times daily., Disp: 16 g, Rfl: 5    levothyroxine (SYNTHROID) 100 MCG tablet, Take 1 tablet (100 mcg total) by mouth once daily., Disp: 90 tablet, Rfl: 1    loratadine (CLARITIN) 10 mg tablet, Take 1 tablet by mouth Daily., Disp: , Rfl:     mupirocin (BACTROBAN) 2 % ointment, Apply topically 2 (two) times daily., Disp: 30 g, Rfl: 3    rosuvastatin (CRESTOR) 20 MG tablet, Take 1 tablet (20 mg total) by mouth Daily., Disp: 90 tablet, Rfl: 1    triamcinolone (KENALOG) 0.5 % ointment, Apply 1 application topically 2 (two) times daily. For rash, Disp: 30 g, Rfl: 1    Review of Systems   Constitutional: Negative for activity change, fatigue and unexpected weight change.   HENT: Negative for hearing loss, postnasal drip, sinus pressure, sore throat and voice change.    Eyes:  "Negative for photophobia and visual disturbance.   Respiratory: Negative for cough, shortness of breath and wheezing.    Cardiovascular: Negative for chest pain and palpitations.   Gastrointestinal: Negative for constipation, diarrhea and nausea.   Genitourinary: Negative for difficulty urinating, frequency, hematuria and urgency.   Musculoskeletal: Negative for arthralgias and back pain.   Skin: Positive for rash.   Neurological: Negative for weakness, light-headedness and headaches.   Hematological: Negative for adenopathy. Does not bruise/bleed easily.   Psychiatric/Behavioral: The patient is not nervous/anxious.           Objective:      Vitals:    11/12/20 0817   BP: 98/64   Pulse: 87   Temp: 98 °F (36.7 °C)   Weight: 95.3 kg (210 lb)   Height: 4' 9" (1.448 m)     Physical Exam  Vitals signs reviewed.   Constitutional:       General: She is not in acute distress.     Appearance: Normal appearance. She is well-developed.   HENT:      Head: Normocephalic and atraumatic.      Right Ear: External ear normal.      Left Ear: External ear normal.      Nose: Nose normal.      Mouth/Throat:      Mouth: Mucous membranes are moist.   Eyes:      General: Lids are normal.      Conjunctiva/sclera: Conjunctivae normal.      Pupils: Pupils are equal, round, and reactive to light.   Neck:      Musculoskeletal: Full passive range of motion without pain and neck supple.      Thyroid: No thyromegaly.      Vascular: No JVD.      Trachea: No tracheal deviation.   Cardiovascular:      Rate and Rhythm: Normal rate and regular rhythm.      Chest Wall: PMI is not displaced.      Pulses: Normal pulses.      Heart sounds: Normal heart sounds.   Pulmonary:      Effort: Pulmonary effort is normal.      Breath sounds: Normal breath sounds.   Abdominal:      General: Bowel sounds are normal.      Palpations: Abdomen is soft.      Tenderness: There is no abdominal tenderness. There is no guarding or rebound.   Musculoskeletal: Normal range of " motion.         General: No tenderness.   Skin:     General: Skin is warm and dry.      Findings: Erythema and rash present. Rash is papular.      Comments: To bilateral cheeks   Neurological:      General: No focal deficit present.      Mental Status: She is alert and oriented to person, place, and time.   Psychiatric:         Mood and Affect: Mood normal.         Behavior: Behavior normal.           Assessment:       1. Acquired hypothyroidism    2. Other hyperlipidemia    3. Vitamin D deficiency    4. Body mass index (BMI) of 40.0 to 44.9 in adult    5. Folliculitis    6. Intrinsic eczema    7. Seasonal allergic rhinitis, unspecified trigger    8. Other acne    9. Dysuria         Plan:       Acquired hypothyroidism  -     TSH w/reflex to FT4; Future; Expected date: 11/12/2020    Other hyperlipidemia  -     Lipid Panel; Future; Expected date: 11/12/2020  -     Comprehensive Metabolic Panel; Future; Expected date: 11/12/2020  -     rosuvastatin (CRESTOR) 20 MG tablet; Take 1 tablet (20 mg total) by mouth Daily.  Dispense: 90 tablet; Refill: 1    Vitamin D deficiency  -     Vitamin D; Future; Expected date: 11/12/2020    Body mass index (BMI) of 40.0 to 44.9 in adult    Folliculitis  -     mupirocin (BACTROBAN) 2 % ointment; Apply topically 2 (two) times daily.  Dispense: 30 g; Refill: 3    Intrinsic eczema    Seasonal allergic rhinitis, unspecified trigger  -     fluticasone propionate (FLONASE) 50 mcg/actuation nasal spray; 1 spray (50 mcg total) by Each Nostril route 2 (two) times daily.  Dispense: 16 g; Refill: 5    Other acne  Comments:  wash skin well, apply steroid cream sparing    Dysuria  -     Urinalysis, Reflex to Urine Culture Urine, Clean Catch; Future; Expected date: 11/12/2020      Follow up in about 6 months (around 5/12/2021) for thyroid.

## 2020-11-13 LAB
25(OH)D3 SERPL-MCNC: 40 NG/ML (ref 30–100)
ALBUMIN SERPL-MCNC: 3.7 G/DL (ref 3.6–5.1)
ALBUMIN/GLOB SERPL: 1 (CALC) (ref 1–2.5)
ALP SERPL-CCNC: 101 U/L (ref 31–125)
ALT SERPL-CCNC: 14 U/L (ref 6–29)
APPEARANCE UR: CLEAR
AST SERPL-CCNC: 19 U/L (ref 10–30)
BACTERIA #/AREA URNS HPF: ABNORMAL /HPF
BACTERIA UR CULT: ABNORMAL
BILIRUB SERPL-MCNC: 0.4 MG/DL (ref 0.2–1.2)
BILIRUB UR QL STRIP: NEGATIVE
BUN SERPL-MCNC: 12 MG/DL (ref 7–25)
BUN/CREAT SERPL: 11 (CALC) (ref 6–22)
CALCIUM SERPL-MCNC: 8.9 MG/DL (ref 8.6–10.2)
CAOX CRY #/AREA URNS HPF: ABNORMAL /HPF
CHLORIDE SERPL-SCNC: 102 MMOL/L (ref 98–110)
CHOLEST SERPL-MCNC: 164 MG/DL
CHOLEST/HDLC SERPL: 3.5 (CALC)
CO2 SERPL-SCNC: 29 MMOL/L (ref 20–32)
COLOR UR: YELLOW
CREAT SERPL-MCNC: 1.11 MG/DL (ref 0.5–1.1)
GFRSERPLBLD MDRD-ARVRAT: 63 ML/MIN/1.73M2
GLOBULIN SER CALC-MCNC: 3.8 G/DL (CALC) (ref 1.9–3.7)
GLUCOSE SERPL-MCNC: 83 MG/DL (ref 65–99)
GLUCOSE UR QL STRIP: NEGATIVE
HDLC SERPL-MCNC: 47 MG/DL
HGB UR QL STRIP: NEGATIVE
HYALINE CASTS #/AREA URNS LPF: ABNORMAL /LPF
KETONES UR QL STRIP: ABNORMAL
LDLC SERPL CALC-MCNC: 96 MG/DL (CALC)
LEUKOCYTE ESTERASE UR QL STRIP: NEGATIVE
NITRITE UR QL STRIP: NEGATIVE
NONHDLC SERPL-MCNC: 117 MG/DL (CALC)
PH UR STRIP: 5.5 [PH] (ref 5–8)
POTASSIUM SERPL-SCNC: 3.9 MMOL/L (ref 3.5–5.3)
PROT SERPL-MCNC: 7.5 G/DL (ref 6.1–8.1)
PROT UR QL STRIP: NEGATIVE
RBC #/AREA URNS HPF: ABNORMAL /HPF
SODIUM SERPL-SCNC: 140 MMOL/L (ref 135–146)
SP GR UR STRIP: 1.02 (ref 1–1.03)
SQUAMOUS #/AREA URNS HPF: ABNORMAL /HPF
TRIGL SERPL-MCNC: 111 MG/DL
TSH SERPL-ACNC: 1.95 MIU/L
WBC #/AREA URNS HPF: ABNORMAL /HPF

## 2021-01-06 ENCOUNTER — TELEPHONE (OUTPATIENT)
Dept: FAMILY MEDICINE | Facility: CLINIC | Age: 41
End: 2021-01-06

## 2021-02-14 ENCOUNTER — HOSPITAL ENCOUNTER (EMERGENCY)
Facility: HOSPITAL | Age: 41
Discharge: HOME | End: 2021-02-14
Attending: EMERGENCY MEDICINE
Payer: MEDICARE

## 2021-02-14 VITALS
DIASTOLIC BLOOD PRESSURE: 71 MMHG | SYSTOLIC BLOOD PRESSURE: 121 MMHG | OXYGEN SATURATION: 100 % | BODY MASS INDEX: 37.54 KG/M2 | HEART RATE: 83 BPM | TEMPERATURE: 98 F | WEIGHT: 204 LBS | HEIGHT: 62 IN | RESPIRATION RATE: 18 BRPM

## 2021-02-14 DIAGNOSIS — R11.10 VOMITING, INTRACTABILITY OF VOMITING NOT SPECIFIED, PRESENCE OF NAUSEA NOT SPECIFIED, UNSPECIFIED VOMITING TYPE: Primary | ICD-10-CM

## 2021-02-14 PROCEDURE — 25000003 PHARM REV CODE 250: Performed by: NURSE PRACTITIONER

## 2021-02-14 PROCEDURE — 99283 EMERGENCY DEPT VISIT LOW MDM: CPT

## 2021-02-14 RX ORDER — ONDANSETRON 4 MG/1
4 TABLET, ORALLY DISINTEGRATING ORAL EVERY 8 HOURS PRN
Qty: 12 TABLET | Refills: 0 | OUTPATIENT
Start: 2021-02-14 | End: 2022-12-02

## 2021-02-14 RX ORDER — ONDANSETRON 4 MG/1
4 TABLET, ORALLY DISINTEGRATING ORAL
Status: COMPLETED | OUTPATIENT
Start: 2021-02-14 | End: 2021-02-14

## 2021-02-14 RX ADMIN — ONDANSETRON 4 MG: 4 TABLET, ORALLY DISINTEGRATING ORAL at 05:02

## 2021-04-29 ENCOUNTER — PATIENT MESSAGE (OUTPATIENT)
Dept: RESEARCH | Facility: HOSPITAL | Age: 41
End: 2021-04-29

## 2021-05-10 DIAGNOSIS — E03.9 HYPOTHYROIDISM, UNSPECIFIED TYPE: ICD-10-CM

## 2021-05-10 DIAGNOSIS — E78.49 OTHER HYPERLIPIDEMIA: Primary | ICD-10-CM

## 2021-05-10 RX ORDER — LEVOTHYROXINE SODIUM 100 UG/1
100 TABLET ORAL DAILY
Qty: 90 TABLET | Refills: 1 | Status: SHIPPED | OUTPATIENT
Start: 2021-05-10 | End: 2021-11-03 | Stop reason: SDUPTHER

## 2021-05-13 ENCOUNTER — TELEPHONE (OUTPATIENT)
Dept: FAMILY MEDICINE | Facility: CLINIC | Age: 41
End: 2021-05-13

## 2021-05-31 ENCOUNTER — TELEPHONE (OUTPATIENT)
Dept: FAMILY MEDICINE | Facility: CLINIC | Age: 41
End: 2021-05-31

## 2021-06-01 ENCOUNTER — OFFICE VISIT (OUTPATIENT)
Dept: FAMILY MEDICINE | Facility: CLINIC | Age: 41
End: 2021-06-01
Payer: MEDICARE

## 2021-06-01 VITALS
SYSTOLIC BLOOD PRESSURE: 110 MMHG | WEIGHT: 209 LBS | HEART RATE: 60 BPM | BODY MASS INDEX: 38.46 KG/M2 | DIASTOLIC BLOOD PRESSURE: 60 MMHG | HEIGHT: 62 IN

## 2021-06-01 DIAGNOSIS — E66.09 CLASS 2 OBESITY DUE TO EXCESS CALORIES WITHOUT SERIOUS COMORBIDITY WITH BODY MASS INDEX (BMI) OF 38.0 TO 38.9 IN ADULT: ICD-10-CM

## 2021-06-01 DIAGNOSIS — E55.9 VITAMIN D DEFICIENCY: ICD-10-CM

## 2021-06-01 DIAGNOSIS — L20.84 INTRINSIC ECZEMA: ICD-10-CM

## 2021-06-01 DIAGNOSIS — E78.49 OTHER HYPERLIPIDEMIA: ICD-10-CM

## 2021-06-01 DIAGNOSIS — E03.9 ACQUIRED HYPOTHYROIDISM: Primary | ICD-10-CM

## 2021-06-01 PROCEDURE — 99214 OFFICE O/P EST MOD 30 MIN: CPT | Mod: S$GLB,,, | Performed by: FAMILY MEDICINE

## 2021-06-01 PROCEDURE — 99214 PR OFFICE/OUTPT VISIT, EST, LEVL IV, 30-39 MIN: ICD-10-PCS | Mod: S$GLB,,, | Performed by: FAMILY MEDICINE

## 2021-06-01 RX ORDER — ROSUVASTATIN CALCIUM 20 MG/1
20 TABLET, COATED ORAL DAILY
Qty: 90 TABLET | Refills: 1 | Status: SHIPPED | OUTPATIENT
Start: 2021-06-01 | End: 2021-11-03 | Stop reason: SDUPTHER

## 2021-06-01 RX ORDER — TRIAMCINOLONE ACETONIDE 5 MG/G
1 OINTMENT TOPICAL 2 TIMES DAILY
Qty: 30 G | Refills: 1 | Status: SHIPPED | OUTPATIENT
Start: 2021-06-01 | End: 2022-03-24 | Stop reason: SDUPTHER

## 2021-06-05 LAB
ALBUMIN SERPL-MCNC: 3.5 G/DL (ref 3.6–5.1)
ALBUMIN/GLOB SERPL: 1 (CALC) (ref 1–2.5)
ALP SERPL-CCNC: 94 U/L (ref 31–125)
ALT SERPL-CCNC: 15 U/L (ref 6–29)
AST SERPL-CCNC: 19 U/L (ref 10–30)
BILIRUB SERPL-MCNC: 0.4 MG/DL (ref 0.2–1.2)
BUN SERPL-MCNC: 13 MG/DL (ref 7–25)
BUN/CREAT SERPL: ABNORMAL (CALC) (ref 6–22)
CALCIUM SERPL-MCNC: 8.5 MG/DL (ref 8.6–10.2)
CHLORIDE SERPL-SCNC: 105 MMOL/L (ref 98–110)
CHOLEST SERPL-MCNC: 151 MG/DL
CHOLEST/HDLC SERPL: 3.6 (CALC)
CO2 SERPL-SCNC: 30 MMOL/L (ref 20–32)
CREAT SERPL-MCNC: 1.01 MG/DL (ref 0.5–1.1)
GLOBULIN SER CALC-MCNC: 3.5 G/DL (CALC) (ref 1.9–3.7)
GLUCOSE SERPL-MCNC: 77 MG/DL (ref 65–99)
HDLC SERPL-MCNC: 42 MG/DL
LDLC SERPL CALC-MCNC: 93 MG/DL (CALC)
NONHDLC SERPL-MCNC: 109 MG/DL (CALC)
POTASSIUM SERPL-SCNC: 3.8 MMOL/L (ref 3.5–5.3)
PROT SERPL-MCNC: 7 G/DL (ref 6.1–8.1)
SODIUM SERPL-SCNC: 140 MMOL/L (ref 135–146)
TRIGL SERPL-MCNC: 75 MG/DL
TSH SERPL-ACNC: 0.47 MIU/L

## 2021-06-16 ENCOUNTER — TELEPHONE (OUTPATIENT)
Dept: FAMILY MEDICINE | Facility: CLINIC | Age: 41
End: 2021-06-16

## 2021-06-21 ENCOUNTER — TELEPHONE (OUTPATIENT)
Dept: FAMILY MEDICINE | Facility: CLINIC | Age: 41
End: 2021-06-21

## 2021-11-03 DIAGNOSIS — E78.49 OTHER HYPERLIPIDEMIA: ICD-10-CM

## 2021-11-03 DIAGNOSIS — E03.9 HYPOTHYROIDISM, UNSPECIFIED TYPE: ICD-10-CM

## 2021-11-03 RX ORDER — ROSUVASTATIN CALCIUM 20 MG/1
20 TABLET, COATED ORAL DAILY
Qty: 90 TABLET | Refills: 1 | Status: SHIPPED | OUTPATIENT
Start: 2021-11-03 | End: 2022-02-01 | Stop reason: SDUPTHER

## 2021-11-03 RX ORDER — LEVOTHYROXINE SODIUM 100 UG/1
100 TABLET ORAL DAILY
Qty: 90 TABLET | Refills: 1 | Status: SHIPPED | OUTPATIENT
Start: 2021-11-03 | End: 2022-02-01 | Stop reason: SDUPTHER

## 2022-02-01 DIAGNOSIS — E03.9 HYPOTHYROIDISM, UNSPECIFIED TYPE: ICD-10-CM

## 2022-02-01 DIAGNOSIS — E78.49 OTHER HYPERLIPIDEMIA: ICD-10-CM

## 2022-02-01 RX ORDER — LEVOTHYROXINE SODIUM 100 UG/1
100 TABLET ORAL DAILY
Qty: 90 TABLET | Refills: 1 | Status: SHIPPED | OUTPATIENT
Start: 2022-02-01 | End: 2022-03-24 | Stop reason: SDUPTHER

## 2022-02-01 RX ORDER — ROSUVASTATIN CALCIUM 20 MG/1
20 TABLET, COATED ORAL DAILY
Qty: 90 TABLET | Refills: 1 | Status: SHIPPED | OUTPATIENT
Start: 2022-02-01 | End: 2022-03-24 | Stop reason: SDUPTHER

## 2022-02-01 NOTE — TELEPHONE ENCOUNTER
----- Message from Heaven Adorno sent at 2/1/2022  1:00 PM CST -----  Pt's mother calling for refills on Rosuvastatin 20 mg  and Levothyroxine to [facility] # [phone]

## 2022-03-03 ENCOUNTER — TELEPHONE (OUTPATIENT)
Dept: FAMILY MEDICINE | Facility: CLINIC | Age: 42
End: 2022-03-03
Payer: MEDICARE

## 2022-03-24 ENCOUNTER — OFFICE VISIT (OUTPATIENT)
Dept: FAMILY MEDICINE | Facility: CLINIC | Age: 42
End: 2022-03-24
Payer: MEDICARE

## 2022-03-24 VITALS
HEIGHT: 62 IN | DIASTOLIC BLOOD PRESSURE: 62 MMHG | HEART RATE: 72 BPM | BODY MASS INDEX: 36.62 KG/M2 | SYSTOLIC BLOOD PRESSURE: 100 MMHG | WEIGHT: 199 LBS

## 2022-03-24 DIAGNOSIS — J30.2 SEASONAL ALLERGIC RHINITIS, UNSPECIFIED TRIGGER: ICD-10-CM

## 2022-03-24 DIAGNOSIS — Z12.31 ENCOUNTER FOR SCREENING MAMMOGRAM FOR MALIGNANT NEOPLASM OF BREAST: ICD-10-CM

## 2022-03-24 DIAGNOSIS — Q90.9 DOWN SYNDROME: ICD-10-CM

## 2022-03-24 DIAGNOSIS — E03.9 ACQUIRED HYPOTHYROIDISM: Primary | ICD-10-CM

## 2022-03-24 DIAGNOSIS — E78.49 OTHER HYPERLIPIDEMIA: ICD-10-CM

## 2022-03-24 DIAGNOSIS — L20.84 INTRINSIC ECZEMA: ICD-10-CM

## 2022-03-24 DIAGNOSIS — R01.1 MURMUR, CARDIAC: ICD-10-CM

## 2022-03-24 PROCEDURE — 99214 OFFICE O/P EST MOD 30 MIN: CPT | Mod: S$GLB,,, | Performed by: FAMILY MEDICINE

## 2022-03-24 PROCEDURE — 99214 PR OFFICE/OUTPT VISIT, EST, LEVL IV, 30-39 MIN: ICD-10-PCS | Mod: S$GLB,,, | Performed by: FAMILY MEDICINE

## 2022-03-24 RX ORDER — TRIAMCINOLONE ACETONIDE 5 MG/G
1 OINTMENT TOPICAL 2 TIMES DAILY
Qty: 30 G | Refills: 1 | Status: SHIPPED | OUTPATIENT
Start: 2022-03-24

## 2022-03-24 RX ORDER — ROSUVASTATIN CALCIUM 20 MG/1
20 TABLET, COATED ORAL DAILY
Qty: 90 TABLET | Refills: 1 | Status: SHIPPED | OUTPATIENT
Start: 2022-03-24 | End: 2022-09-26 | Stop reason: SDUPTHER

## 2022-03-24 RX ORDER — LEVOTHYROXINE SODIUM 100 UG/1
100 TABLET ORAL DAILY
Qty: 90 TABLET | Refills: 1 | Status: SHIPPED | OUTPATIENT
Start: 2022-03-24 | End: 2022-09-26 | Stop reason: SDUPTHER

## 2022-03-24 NOTE — PROGRESS NOTES
SUBJECTIVE:    Patient ID: Catalina Lovett is a 41 y.o. female.    Chief Complaint: Follow-up (Acquired hypothyroidism, went over meds verbally// SW)    Patient with Down syndrome, hypothyroidism, and hyperlipidemia presents for visit accompanied by her mother as usual.  Other than issues with eczema and dry skin she has no major complaints.  She has been working on her weight has managed to lose 10 lb since her last visit.  She does exercise by walking twice a week.  Does report any joint pain or urine problems.      Past Medical History:   Diagnosis Date    Down's syndrome     Hyperlipidemia     Hypothyroidism      Past Surgical History:   Procedure Laterality Date    TEAR DUCT SURGERY       Family History   Problem Relation Age of Onset    Hypertension Mother     Diabetes Mother        Marital Status: Single  Alcohol History:  reports no history of alcohol use.  Tobacco History:  reports that she has never smoked. She has never used smokeless tobacco.  Drug History:  reports no history of drug use.    Review of patient's allergies indicates:   Allergen Reactions    Sulfa (sulfonamide antibiotics)        Current Outpatient Medications:     cholecalciferol, vitamin D3, (VITAMIN D3) 50 mcg (2,000 unit) Cap, Take 1 capsule by mouth Daily., Disp: , Rfl:     loratadine (CLARITIN) 10 mg tablet, Take 1 tablet by mouth Daily., Disp: , Rfl:     mupirocin (BACTROBAN) 2 % ointment, Apply topically 2 (two) times daily., Disp: 30 g, Rfl: 3    ondansetron (ZOFRAN-ODT) 4 MG TbDL, Take 1 tablet (4 mg total) by mouth every 8 (eight) hours as needed., Disp: 12 tablet, Rfl: 0    levothyroxine (SYNTHROID) 100 MCG tablet, Take 1 tablet (100 mcg total) by mouth once daily., Disp: 90 tablet, Rfl: 1    rosuvastatin (CRESTOR) 20 MG tablet, Take 1 tablet (20 mg total) by mouth Daily., Disp: 90 tablet, Rfl: 1    triamcinolone (KENALOG) 0.5 % ointment, Apply 1 application topically 2 (two) times daily. For rash, Disp: 30  "g, Rfl: 1    Review of Systems   Constitutional: Negative for activity change, fatigue and unexpected weight change.   HENT: Negative for hearing loss, postnasal drip, sinus pressure, sore throat and voice change.    Eyes: Negative for photophobia and visual disturbance.   Respiratory: Negative for cough, shortness of breath and wheezing.    Cardiovascular: Negative for chest pain and palpitations.   Gastrointestinal: Negative for constipation, diarrhea and nausea.   Genitourinary: Negative for difficulty urinating, frequency, hematuria and urgency.   Musculoskeletal: Negative for arthralgias and back pain.   Skin: Positive for rash.   Neurological: Negative for weakness, light-headedness and headaches.   Hematological: Negative for adenopathy. Does not bruise/bleed easily.   Psychiatric/Behavioral: The patient is not nervous/anxious.           Objective:      Vitals:    03/24/22 1501   BP: 100/62   Pulse: 72   Weight: 90.3 kg (199 lb)   Height: 5' 2" (1.575 m)     Physical Exam  Vitals reviewed.   Constitutional:       General: She is not in acute distress.     Appearance: Normal appearance. She is well-developed.      Comments: Down syndrome features   HENT:      Head: Normocephalic and atraumatic.      Right Ear: External ear normal.      Left Ear: External ear normal.      Nose: Nose normal.      Mouth/Throat:      Mouth: Mucous membranes are moist.   Eyes:      General: Lids are normal.      Conjunctiva/sclera: Conjunctivae normal.      Pupils: Pupils are equal, round, and reactive to light.   Neck:      Thyroid: No thyromegaly.      Vascular: No JVD.      Trachea: No tracheal deviation.   Cardiovascular:      Rate and Rhythm: Normal rate and regular rhythm.      Chest Wall: PMI is not displaced.      Pulses: Normal pulses.      Heart sounds: Murmur heard.   Pulmonary:      Effort: Pulmonary effort is normal.      Breath sounds: Normal breath sounds.   Abdominal:      General: Bowel sounds are normal.      " Palpations: Abdomen is soft.      Tenderness: There is no abdominal tenderness. There is no guarding or rebound.   Musculoskeletal:         General: No tenderness. Normal range of motion.      Cervical back: Full passive range of motion without pain and neck supple.   Skin:     General: Skin is warm and dry.      Findings: No rash.   Neurological:      General: No focal deficit present.      Mental Status: She is alert and oriented to person, place, and time.   Psychiatric:         Mood and Affect: Mood normal.         Behavior: Behavior normal.           Assessment:       1. Acquired hypothyroidism    2. Murmur, cardiac    3. Other hyperlipidemia    4. Intrinsic eczema    5. Seasonal allergic rhinitis, unspecified trigger    6. Encounter for screening mammogram for malignant neoplasm of breast    7. Down syndrome         Plan:       Acquired hypothyroidism  -     TSH w/reflex to FT4; Future; Expected date: 03/24/2022  -     levothyroxine (SYNTHROID) 100 MCG tablet; Take 1 tablet (100 mcg total) by mouth once daily.  Dispense: 90 tablet; Refill: 1    Murmur, cardiac  -     Echo Saline Bubble? No; Future    Other hyperlipidemia  -     Lipid Panel; Future; Expected date: 03/24/2022  -     Comprehensive Metabolic Panel; Future; Expected date: 03/24/2022  -     rosuvastatin (CRESTOR) 20 MG tablet; Take 1 tablet (20 mg total) by mouth Daily.  Dispense: 90 tablet; Refill: 1    Intrinsic eczema  -     triamcinolone (KENALOG) 0.5 % ointment; Apply 1 application topically 2 (two) times daily. For rash  Dispense: 30 g; Refill: 1    Seasonal allergic rhinitis, unspecified trigger    Encounter for screening mammogram for malignant neoplasm of breast  -     Mammo Digital Screening Bilat w/ Adriel; Future; Expected date: 03/24/2022    Down syndrome  -     Echo Saline Bubble? No; Future      Follow up in about 6 months (around 9/24/2022) for thyroid.

## 2022-03-25 LAB
ALBUMIN SERPL-MCNC: 3.6 G/DL (ref 3.6–5.1)
ALBUMIN/GLOB SERPL: 0.9 (CALC) (ref 1–2.5)
ALP SERPL-CCNC: 96 U/L (ref 31–125)
ALT SERPL-CCNC: 15 U/L (ref 6–29)
AST SERPL-CCNC: 18 U/L (ref 10–30)
BILIRUB SERPL-MCNC: 0.4 MG/DL (ref 0.2–1.2)
BUN SERPL-MCNC: 11 MG/DL (ref 7–25)
BUN/CREAT SERPL: ABNORMAL (CALC) (ref 6–22)
CALCIUM SERPL-MCNC: 8.9 MG/DL (ref 8.6–10.2)
CHLORIDE SERPL-SCNC: 102 MMOL/L (ref 98–110)
CHOLEST SERPL-MCNC: 163 MG/DL
CHOLEST/HDLC SERPL: 3.9 (CALC)
CO2 SERPL-SCNC: 30 MMOL/L (ref 20–32)
CREAT SERPL-MCNC: 1.01 MG/DL (ref 0.5–1.1)
GLOBULIN SER CALC-MCNC: 3.9 G/DL (CALC) (ref 1.9–3.7)
GLUCOSE SERPL-MCNC: 84 MG/DL (ref 65–99)
HDLC SERPL-MCNC: 42 MG/DL
LDLC SERPL CALC-MCNC: 102 MG/DL (CALC)
NONHDLC SERPL-MCNC: 121 MG/DL (CALC)
POTASSIUM SERPL-SCNC: 4.1 MMOL/L (ref 3.5–5.3)
PROT SERPL-MCNC: 7.5 G/DL (ref 6.1–8.1)
SODIUM SERPL-SCNC: 139 MMOL/L (ref 135–146)
TRIGL SERPL-MCNC: 96 MG/DL
TSH SERPL-ACNC: 0.86 MIU/L

## 2022-04-20 ENCOUNTER — CLINICAL SUPPORT (OUTPATIENT)
Dept: CARDIOLOGY | Facility: HOSPITAL | Age: 42
End: 2022-04-20
Attending: FAMILY MEDICINE
Payer: MEDICARE

## 2022-04-20 DIAGNOSIS — R01.1 MURMUR, CARDIAC: ICD-10-CM

## 2022-04-20 DIAGNOSIS — Q90.9 DOWN SYNDROME: ICD-10-CM

## 2022-04-20 LAB
AORTIC ROOT ANNULUS: 2.43 CM
AV INDEX (PROSTH): 0.84
AV MEAN GRADIENT: 2 MMHG
AV PEAK GRADIENT: 4 MMHG
AV VALVE AREA: 2.65 CM2
AV VELOCITY RATIO: 78.87
CV ECHO LV RWT: 0.41 CM
DOP CALC AO PEAK VEL: 1 M/S
DOP CALC AO VTI: 21.53 CM
DOP CALC LVOT AREA: 3.1 CM2
DOP CALC LVOT DIAMETER: 2 CM
DOP CALC LVOT PEAK VEL: 78.87 M/S
DOP CALC LVOT STROKE VOLUME: 57.09 CM3
DOP CALCLVOT PEAK VEL VTI: 18.18 CM
E WAVE DECELERATION TIME: 198.91 MSEC
E/A RATIO: 1.87
E/E' RATIO: 6.52 M/S
ECHO LV POSTERIOR WALL: 0.8 CM (ref 0.6–1.1)
EJECTION FRACTION: 65 %
FRACTIONAL SHORTENING: 48 % (ref 28–44)
INTERVENTRICULAR SEPTUM: 0.76 CM (ref 0.6–1.1)
IVC DIAMETER: 1.4 CM
LEFT ATRIUM SIZE: 2.16 CM
LEFT INTERNAL DIMENSION IN SYSTOLE: 2.03 CM (ref 2.1–4)
LEFT VENTRICLE DIASTOLIC VOLUME: 65.6 ML
LEFT VENTRICLE SYSTOLIC VOLUME: 25.3 ML
LEFT VENTRICULAR INTERNAL DIMENSION IN DIASTOLE: 3.87 CM (ref 3.5–6)
LEFT VENTRICULAR MASS: 85.58 G
LV LATERAL E/E' RATIO: 6.29 M/S
LV SEPTAL E/E' RATIO: 6.77 M/S
MV PEAK A VEL: 0.47 M/S
MV PEAK E VEL: 0.88 M/S
PISA MRMAX VEL: 4.1 M/S
PISA TR MAX VEL: 2.64 M/S
RA PRESSURE: 3 MMHG
RIGHT VENTRICULAR END-DIASTOLIC DIMENSION: 260 CM
TDI LATERAL: 0.14 M/S
TDI SEPTAL: 0.13 M/S
TDI: 0.14 M/S
TR MAX PG: 28 MMHG
TV REST PULMONARY ARTERY PRESSURE: 31 MMHG

## 2022-04-20 PROCEDURE — 93306 TTE W/DOPPLER COMPLETE: CPT | Mod: 26,,, | Performed by: INTERNAL MEDICINE

## 2022-04-20 PROCEDURE — 93306 TTE W/DOPPLER COMPLETE: CPT

## 2022-04-20 PROCEDURE — 93306 ECHO (CUPID ONLY): ICD-10-PCS | Mod: 26,,, | Performed by: INTERNAL MEDICINE

## 2022-04-21 ENCOUNTER — HOSPITAL ENCOUNTER (OUTPATIENT)
Dept: RADIOLOGY | Facility: HOSPITAL | Age: 42
Discharge: HOME | End: 2022-04-21
Attending: FAMILY MEDICINE
Payer: MEDICARE

## 2022-04-21 DIAGNOSIS — Z12.31 ENCOUNTER FOR SCREENING MAMMOGRAM FOR MALIGNANT NEOPLASM OF BREAST: ICD-10-CM

## 2022-04-21 PROCEDURE — 77063 BREAST TOMOSYNTHESIS BI: CPT | Mod: TC,PO

## 2022-05-02 ENCOUNTER — TELEPHONE (OUTPATIENT)
Dept: FAMILY MEDICINE | Facility: CLINIC | Age: 42
End: 2022-05-02

## 2022-05-02 DIAGNOSIS — E03.9 ACQUIRED HYPOTHYROIDISM: ICD-10-CM

## 2022-05-02 NOTE — TELEPHONE ENCOUNTER
----- Message from Heaven Adorno sent at 5/2/2022  2:44 PM CDT -----  Pt's mother calling for refills on Levothyroxine and Rosuvstatin 20 mg sent to [facility] # [phone]

## 2022-09-26 ENCOUNTER — OFFICE VISIT (OUTPATIENT)
Dept: FAMILY MEDICINE | Facility: CLINIC | Age: 42
End: 2022-09-26
Payer: MEDICARE

## 2022-09-26 VITALS
HEART RATE: 74 BPM | SYSTOLIC BLOOD PRESSURE: 98 MMHG | DIASTOLIC BLOOD PRESSURE: 62 MMHG | BODY MASS INDEX: 35.51 KG/M2 | WEIGHT: 193 LBS | HEIGHT: 62 IN

## 2022-09-26 DIAGNOSIS — R30.0 DYSURIA: ICD-10-CM

## 2022-09-26 DIAGNOSIS — J30.2 SEASONAL ALLERGIC RHINITIS, UNSPECIFIED TRIGGER: ICD-10-CM

## 2022-09-26 DIAGNOSIS — R73.9 HYPERGLYCEMIA: ICD-10-CM

## 2022-09-26 DIAGNOSIS — E66.09 CLASS 2 OBESITY DUE TO EXCESS CALORIES WITHOUT SERIOUS COMORBIDITY WITH BODY MASS INDEX (BMI) OF 38.0 TO 38.9 IN ADULT: ICD-10-CM

## 2022-09-26 DIAGNOSIS — E55.9 VITAMIN D DEFICIENCY: ICD-10-CM

## 2022-09-26 DIAGNOSIS — E78.49 OTHER HYPERLIPIDEMIA: ICD-10-CM

## 2022-09-26 DIAGNOSIS — E03.9 ACQUIRED HYPOTHYROIDISM: Primary | ICD-10-CM

## 2022-09-26 DIAGNOSIS — Z23 FLU VACCINE NEED: ICD-10-CM

## 2022-09-26 PROCEDURE — 99214 PR OFFICE/OUTPT VISIT, EST, LEVL IV, 30-39 MIN: ICD-10-PCS | Mod: S$GLB,,, | Performed by: FAMILY MEDICINE

## 2022-09-26 PROCEDURE — G0008 FLU VACCINE - QUADRIVALENT (RECOMBINANT) PRESERVATIVE FREE: ICD-10-PCS | Mod: S$GLB,,, | Performed by: FAMILY MEDICINE

## 2022-09-26 PROCEDURE — 90682 FLU VACCINE - QUADRIVALENT (RECOMBINANT) PRESERVATIVE FREE: ICD-10-PCS | Mod: S$GLB,,, | Performed by: FAMILY MEDICINE

## 2022-09-26 PROCEDURE — 99214 OFFICE O/P EST MOD 30 MIN: CPT | Mod: S$GLB,,, | Performed by: FAMILY MEDICINE

## 2022-09-26 PROCEDURE — 90682 RIV4 VACC RECOMBINANT DNA IM: CPT | Mod: S$GLB,,, | Performed by: FAMILY MEDICINE

## 2022-09-26 PROCEDURE — G0008 ADMIN INFLUENZA VIRUS VAC: HCPCS | Mod: S$GLB,,, | Performed by: FAMILY MEDICINE

## 2022-09-26 RX ORDER — LEVOTHYROXINE SODIUM 100 UG/1
100 TABLET ORAL DAILY
Qty: 90 TABLET | Refills: 1 | Status: SHIPPED | OUTPATIENT
Start: 2022-09-26 | End: 2023-04-05

## 2022-09-26 RX ORDER — ROSUVASTATIN CALCIUM 20 MG/1
20 TABLET, COATED ORAL DAILY
Qty: 90 TABLET | Refills: 1 | Status: SHIPPED | OUTPATIENT
Start: 2022-09-26 | End: 2023-04-05 | Stop reason: SDUPTHER

## 2022-09-26 NOTE — PROGRESS NOTES
SUBJECTIVE:   HPI: Catalina Lovett  is a 42 y.o. female who presents for annual physical .   Hyperlipidemia (Did not bring bottles//needs refills//needs flu shot//bs) and Thyroid Problem    Patient with hyperlipidemia, hypothyroidism and Down syndrome presents with her mother for her regular visit.  On last meeting it was noted that patient has significant cardiac murmur.  As valvular disease is common in down syndrome patient is cardiac evaluation was ordered.  Echo demonstrates normal systolic and diastolic function.  Normal ejection fraction and no significant valvular abnormalities.  Lab values have been stable and patient has not had any complaints.  Continues to walk occasionally but not has done is much because of the weather.        Clinical Support on 04/20/2022   Component Date Value Ref Range Status    Right Atrial Pressure (from IVC) 04/20/2022 3  mmHg Final    IVC diameter 04/20/2022 1.4  cm Final    Ao peak anthony 04/20/2022 1.0  m/s Final    Mr max anthony 04/20/2022 4.1  m/s Final    AV peak gradient 04/20/2022 4  mmHg Final    TV rest pulmonary artery pressure 04/20/2022 31  mmHg Final    TR Max Anthony 04/20/2022 2.64  m/s Final    Triscuspid Valve Regurgitation Pea* 04/20/2022 28  mmHg Final    TDI SEPTAL 04/20/2022 0.13  m/s Final    LV LATERAL E/E' RATIO 04/20/2022 6.29  m/s Final    LV SEPTAL E/E' RATIO 04/20/2022 6.77  m/s Final    TDI LATERAL 04/20/2022 0.14  m/s Final    LVIDd 04/20/2022 3.87  3.5 - 6.0 cm Final    IVS 04/20/2022 0.76  0.6 - 1.1 cm Final    Posterior Wall 04/20/2022 0.80  0.6 - 1.1 cm Final    Ao root annulus 04/20/2022 2.43  cm Final    LVIDs 04/20/2022 2.03 (A)  2.1 - 4.0 cm Final    FS 04/20/2022 48  28 - 44 % Final    LV mass 04/20/2022 85.58  g Final    LA size 04/20/2022 2.16  cm Final    RVDD 04/20/2022 260.00  cm Final    Left Ventricle Relative Wall Thick* 04/20/2022 0.41  cm Final    AV mean gradient 04/20/2022 2  mmHg Final    AV valve area 04/20/2022 2.65  cm2 Final     AV Velocity Ratio 04/20/2022 78.87   Final    AV index (prosthetic) 04/20/2022 0.84   Final    E/A ratio 04/20/2022 1.87   Final    Mean e' 04/20/2022 0.14  m/s Final    E wave deceleration time 04/20/2022 198.91  msec Final    LVOT diameter 04/20/2022 2.00  cm Final    LVOT area 04/20/2022 3.1  cm2 Final    LVOT peak anthony 04/20/2022 78.87  m/s Final    LVOT peak VTI 04/20/2022 18.18  cm Final    Ao VTI 04/20/2022 21.53  cm Final    LVOT stroke volume 04/20/2022 57.09  cm3 Final    E/E' ratio 04/20/2022 6.52  m/s Final    MV Peak E Anthony 04/20/2022 0.88  m/s Final    MV Peak A Anthony 04/20/2022 0.47  m/s Final    LV Systolic Volume 04/20/2022 25.30  mL Final    LV Diastolic Volume 04/20/2022 65.60  mL Final    EF 04/20/2022 65  % Final      (Not in a hospital admission)    Review of patient's allergies indicates:   Allergen Reactions    Sulfa (sulfonamide antibiotics)      Current Outpatient Medications on File Prior to Visit   Medication Sig Dispense Refill    cholecalciferol, vitamin D3, (VITAMIN D3) 50 mcg (2,000 unit) Cap Take 1 capsule by mouth Daily.      loratadine (CLARITIN) 10 mg tablet Take 1 tablet by mouth Daily.      mupirocin (BACTROBAN) 2 % ointment Apply topically 2 (two) times daily. 30 g 3    ondansetron (ZOFRAN-ODT) 4 MG TbDL Take 1 tablet (4 mg total) by mouth every 8 (eight) hours as needed. 12 tablet 0    triamcinolone (KENALOG) 0.5 % ointment Apply 1 application topically 2 (two) times daily. For rash 30 g 1    [DISCONTINUED] levothyroxine (SYNTHROID) 100 MCG tablet Take 1 tablet (100 mcg total) by mouth once daily. 90 tablet 1    [DISCONTINUED] rosuvastatin (CRESTOR) 20 MG tablet Take 1 tablet (20 mg total) by mouth Daily. 90 tablet 1     No current facility-administered medications on file prior to visit.     Past Medical History:   Diagnosis Date    Down's syndrome     Hyperlipidemia     Hypothyroidism      Past Surgical History:   Procedure Laterality Date    TEAR DUCT SURGERY       Family  "History   Problem Relation Age of Onset    Hypertension Mother     Diabetes Mother      Social History     Tobacco Use    Smoking status: Never    Smokeless tobacco: Never   Substance Use Topics    Alcohol use: Never    Drug use: Never      Health Maintenance Topics with due status: Not Due       Topic Last Completion Date    Mammogram 04/21/2022     Immunization History   Administered Date(s) Administered    COVID-19, MRNA, LN-S, PF (MODERNA FULL 0.5 ML DOSE) 01/18/2021, 02/19/2021, 11/12/2021    DT (Pediatric) 02/10/1995    Influenza 12/20/2013, 10/19/2015, 10/26/2016    Influenza (FLUBLOK) - Quadrivalent - Recombinant - PF *Preferred* (egg allergy) 10/29/2019, 09/26/2022    Influenza - Quadrivalent - PF *Preferred* (6 months and older) 10/11/2017, 09/22/2020, 09/23/2020, 10/21/2021, 10/25/2021    Influenza - Trivalent (ADULT) 10/30/2008, 10/09/2009, 11/05/2010    Influenza - Trivalent - PF (ADULT) 12/20/2013, 10/26/2016    Influenza - Trivalent - Recombinant - PF 10/15/2018    Influenza Whole 11/02/1998    MMR 02/10/1995, 10/09/2009    PPD Test 08/17/1994    Td (ADULT) 10/30/2008       Review of Systems   All other systems reviewed and are negative.   OBJECTIVE:      Vitals:    09/26/22 1450   BP: 98/62   Pulse: 74   Weight: 87.5 kg (193 lb)   Height: 5' 2" (1.575 m)     Physical Exam  Constitutional:       Appearance: Normal appearance.   HENT:      Head: Normocephalic and atraumatic.      Mouth/Throat:      Mouth: Mucous membranes are moist.   Eyes:      Conjunctiva/sclera: Conjunctivae normal.   Cardiovascular:      Heart sounds: Normal heart sounds.   Pulmonary:      Effort: Pulmonary effort is normal.   Neurological:      General: No focal deficit present.      Mental Status: She is alert and oriented to person, place, and time.   Psychiatric:         Mood and Affect: Mood normal.         Behavior: Behavior normal.      Assessment:       1. Acquired hypothyroidism    2. Other hyperlipidemia    3. Vitamin D " deficiency    4. Seasonal allergic rhinitis, unspecified trigger    5. Class 2 obesity due to excess calories without serious comorbidity with body mass index (BMI) of 38.0 to 38.9 in adult    6. Flu vaccine need    7. Dysuria    8. Hyperglycemia        Plan:       Acquired hypothyroidism  -     levothyroxine (SYNTHROID) 100 MCG tablet; Take 1 tablet (100 mcg total) by mouth once daily.  Dispense: 90 tablet; Refill: 1  -     CBC Auto Differential; Future; Expected date: 03/01/2023  -     Comprehensive Metabolic Panel; Future; Expected date: 03/01/2023  -     TSH w/reflex to FT4; Future; Expected date: 03/01/2023    Other hyperlipidemia  -     rosuvastatin (CRESTOR) 20 MG tablet; Take 1 tablet (20 mg total) by mouth Daily.  Dispense: 90 tablet; Refill: 1  -     Lipid Panel; Future; Expected date: 03/01/2023    Vitamin D deficiency    Seasonal allergic rhinitis, unspecified trigger    Class 2 obesity due to excess calories without serious comorbidity with body mass index (BMI) of 38.0 to 38.9 in adult  Comments:  continue increased activity    Flu vaccine need  -     Influenza - Quadrivalent (Recombinant) (PF)    Dysuria  -     Urinalysis; Future; Expected date: 03/01/2023    Hyperglycemia  -     Hemoglobin A1C; Future; Expected date: 03/01/2023      Counseled on age and gender appropriate medical preventative services, including cancer screenings, immunizations, overall nutritional health, need for a consistent exercise regimen and an overall push towards maintaining a vigorous and active lifestyle.      Follow up in about 6 months (around 3/26/2023) for cholesterol .

## 2022-10-31 ENCOUNTER — TELEPHONE (OUTPATIENT)
Dept: FAMILY MEDICINE | Facility: CLINIC | Age: 42
End: 2022-10-31

## 2022-11-15 LAB
ALBUMIN SERPL-MCNC: 3.6 G/DL (ref 3.6–5.1)
ALBUMIN/GLOB SERPL: 1 (CALC) (ref 1–2.5)
ALP SERPL-CCNC: 94 U/L (ref 31–125)
ALT SERPL-CCNC: 11 U/L (ref 6–29)
AST SERPL-CCNC: 14 U/L (ref 10–30)
BASOPHILS # BLD AUTO: 69 CELLS/UL (ref 0–200)
BASOPHILS NFR BLD AUTO: 1 %
BILIRUB SERPL-MCNC: 0.4 MG/DL (ref 0.2–1.2)
BUN SERPL-MCNC: 17 MG/DL (ref 7–25)
BUN/CREAT SERPL: 16 (CALC) (ref 6–22)
CALCIUM SERPL-MCNC: 8.8 MG/DL (ref 8.6–10.2)
CHLORIDE SERPL-SCNC: 103 MMOL/L (ref 98–110)
CHOLEST SERPL-MCNC: 146 MG/DL
CHOLEST/HDLC SERPL: 3.9 (CALC)
CO2 SERPL-SCNC: 30 MMOL/L (ref 20–32)
CREAT SERPL-MCNC: 1.05 MG/DL (ref 0.5–0.99)
EGFR: 68 ML/MIN/1.73M2
EOSINOPHIL # BLD AUTO: 28 CELLS/UL (ref 15–500)
EOSINOPHIL NFR BLD AUTO: 0.4 %
ERYTHROCYTE [DISTWIDTH] IN BLOOD BY AUTOMATED COUNT: 14.7 % (ref 11–15)
GLOBULIN SER CALC-MCNC: 3.5 G/DL (CALC) (ref 1.9–3.7)
GLUCOSE SERPL-MCNC: 70 MG/DL (ref 65–99)
HBA1C MFR BLD: 5.4 % OF TOTAL HGB
HCT VFR BLD AUTO: 42.7 % (ref 35–45)
HDLC SERPL-MCNC: 37 MG/DL
HGB BLD-MCNC: 13.9 G/DL (ref 11.7–15.5)
LDLC SERPL CALC-MCNC: 91 MG/DL (CALC)
LYMPHOCYTES # BLD AUTO: 1711 CELLS/UL (ref 850–3900)
LYMPHOCYTES NFR BLD AUTO: 24.8 %
MCH RBC QN AUTO: 29.4 PG (ref 27–33)
MCHC RBC AUTO-ENTMCNC: 32.6 G/DL (ref 32–36)
MCV RBC AUTO: 90.5 FL (ref 80–100)
MONOCYTES # BLD AUTO: 428 CELLS/UL (ref 200–950)
MONOCYTES NFR BLD AUTO: 6.2 %
NEUTROPHILS # BLD AUTO: 4664 CELLS/UL (ref 1500–7800)
NEUTROPHILS NFR BLD AUTO: 67.6 %
NONHDLC SERPL-MCNC: 109 MG/DL (CALC)
PLATELET # BLD AUTO: 209 THOUSAND/UL (ref 140–400)
PMV BLD REES-ECKER: 9.9 FL (ref 7.5–12.5)
POTASSIUM SERPL-SCNC: 4.1 MMOL/L (ref 3.5–5.3)
PROT SERPL-MCNC: 7.1 G/DL (ref 6.1–8.1)
RBC # BLD AUTO: 4.72 MILLION/UL (ref 3.8–5.1)
SODIUM SERPL-SCNC: 141 MMOL/L (ref 135–146)
TRIGL SERPL-MCNC: 88 MG/DL
TSH SERPL-ACNC: 0.79 MIU/L
WBC # BLD AUTO: 6.9 THOUSAND/UL (ref 3.8–10.8)

## 2022-12-02 ENCOUNTER — HOSPITAL ENCOUNTER (EMERGENCY)
Facility: HOSPITAL | Age: 42
Discharge: HOME | End: 2022-12-02
Attending: EMERGENCY MEDICINE
Payer: MEDICARE

## 2022-12-02 VITALS
WEIGHT: 189 LBS | SYSTOLIC BLOOD PRESSURE: 110 MMHG | HEART RATE: 53 BPM | OXYGEN SATURATION: 93 % | DIASTOLIC BLOOD PRESSURE: 68 MMHG | BODY MASS INDEX: 34.57 KG/M2 | RESPIRATION RATE: 17 BRPM | TEMPERATURE: 97 F

## 2022-12-02 DIAGNOSIS — R51.9 ACUTE NONINTRACTABLE HEADACHE, UNSPECIFIED HEADACHE TYPE: Primary | ICD-10-CM

## 2022-12-02 LAB
B-HCG UR QL: NEGATIVE
HCV AB SERPL QL IA: REACTIVE
HIV 1+2 AB+HIV1 P24 AG SERPL QL IA: NORMAL
INFLUENZA A, MOLECULAR: NEGATIVE
INFLUENZA B, MOLECULAR: NEGATIVE
POCT GLUCOSE: 79 MG/DL (ref 70–110)
SARS-COV-2 RDRP RESP QL NAA+PROBE: NEGATIVE
SPECIMEN SOURCE: NORMAL

## 2022-12-02 PROCEDURE — 99285 EMERGENCY DEPT VISIT HI MDM: CPT | Mod: 25

## 2022-12-02 PROCEDURE — 87389 HIV-1 AG W/HIV-1&-2 AB AG IA: CPT | Performed by: EMERGENCY MEDICINE

## 2022-12-02 PROCEDURE — 96374 THER/PROPH/DIAG INJ IV PUSH: CPT | Mod: 59

## 2022-12-02 PROCEDURE — 63600175 PHARM REV CODE 636 W HCPCS: Performed by: NURSE PRACTITIONER

## 2022-12-02 PROCEDURE — U0002 COVID-19 LAB TEST NON-CDC: HCPCS | Performed by: NURSE PRACTITIONER

## 2022-12-02 PROCEDURE — 82962 GLUCOSE BLOOD TEST: CPT

## 2022-12-02 PROCEDURE — 36415 COLL VENOUS BLD VENIPUNCTURE: CPT | Performed by: EMERGENCY MEDICINE

## 2022-12-02 PROCEDURE — 87502 INFLUENZA DNA AMP PROBE: CPT | Performed by: NURSE PRACTITIONER

## 2022-12-02 PROCEDURE — 25500020 PHARM REV CODE 255

## 2022-12-02 PROCEDURE — 25000003 PHARM REV CODE 250: Performed by: NURSE PRACTITIONER

## 2022-12-02 PROCEDURE — 86803 HEPATITIS C AB TEST: CPT | Performed by: EMERGENCY MEDICINE

## 2022-12-02 PROCEDURE — 81025 URINE PREGNANCY TEST: CPT | Performed by: NURSE PRACTITIONER

## 2022-12-02 PROCEDURE — 96375 TX/PRO/DX INJ NEW DRUG ADDON: CPT

## 2022-12-02 RX ORDER — METOCLOPRAMIDE HYDROCHLORIDE 5 MG/ML
5 INJECTION INTRAMUSCULAR; INTRAVENOUS
Status: COMPLETED | OUTPATIENT
Start: 2022-12-02 | End: 2022-12-02

## 2022-12-02 RX ORDER — SODIUM CHLORIDE 9 MG/ML
1000 INJECTION, SOLUTION INTRAVENOUS
Status: COMPLETED | OUTPATIENT
Start: 2022-12-02 | End: 2022-12-02

## 2022-12-02 RX ORDER — DIPHENHYDRAMINE HYDROCHLORIDE 50 MG/ML
25 INJECTION INTRAMUSCULAR; INTRAVENOUS
Status: COMPLETED | OUTPATIENT
Start: 2022-12-02 | End: 2022-12-02

## 2022-12-02 RX ORDER — ONDANSETRON 4 MG/1
4 TABLET, ORALLY DISINTEGRATING ORAL EVERY 6 HOURS PRN
Qty: 20 TABLET | Refills: 0 | Status: SHIPPED | OUTPATIENT
Start: 2022-12-02 | End: 2023-04-05

## 2022-12-02 RX ORDER — KETOROLAC TROMETHAMINE 30 MG/ML
10 INJECTION, SOLUTION INTRAMUSCULAR; INTRAVENOUS
Status: COMPLETED | OUTPATIENT
Start: 2022-12-02 | End: 2022-12-02

## 2022-12-02 RX ADMIN — DIPHENHYDRAMINE HYDROCHLORIDE 25 MG: 50 INJECTION INTRAMUSCULAR; INTRAVENOUS at 11:12

## 2022-12-02 RX ADMIN — KETOROLAC TROMETHAMINE 10 MG: 30 INJECTION, SOLUTION INTRAMUSCULAR; INTRAVENOUS at 11:12

## 2022-12-02 RX ADMIN — SODIUM CHLORIDE 1000 ML: 0.9 INJECTION, SOLUTION INTRAVENOUS at 11:12

## 2022-12-02 RX ADMIN — IOHEXOL 75 ML: 350 INJECTION, SOLUTION INTRAVENOUS at 12:12

## 2022-12-02 RX ADMIN — METOCLOPRAMIDE 5 MG: 5 INJECTION, SOLUTION INTRAMUSCULAR; INTRAVENOUS at 11:12

## 2022-12-02 NOTE — ED PROVIDER NOTES
Encounter Date: 12/2/2022    SCRIBE #1 NOTE: Sharonda DUARTE am scribing for, and in the presence of,  LUIS Hernandez.     History     Chief Complaint   Patient presents with    Headache     Right frontal headache since last night     Time seen by provider: 10:58 AM on 12/02/2022    Catalina Lovett is a 42 y.o. female who presents to the ED with a sudden onset of a frontal headache that began last night. The patient's caregiver reports the headache kept the patient up the entire night. Caregiver reports giving the patient 2 doses of Tylenol last night. The patient recently had blood work completed which was normal, per caregiver. The patient and caregiver deny neck pain, ear pain, N/V, visual disturbance, abdominal pain, cough, nasal congestion, fever, or any other symptoms at this time. Caregiver denies Hx of migraines or kidney abnormalities. Allergy noted to Sulfa. PMHx of Down's Syndrome, HLD, and hypothyroidism. No pertinent PSHx.       The history is provided by the patient and a caregiver.   Review of patient's allergies indicates:   Allergen Reactions    Sulfa (sulfonamide antibiotics)      Past Medical History:   Diagnosis Date    Down's syndrome     Hyperlipidemia     Hypothyroidism      Past Surgical History:   Procedure Laterality Date    TEAR DUCT SURGERY       Family History   Problem Relation Age of Onset    Hypertension Mother     Diabetes Mother      Social History     Tobacco Use    Smoking status: Never    Smokeless tobacco: Never   Substance Use Topics    Alcohol use: Never    Drug use: Never     Review of Systems   Constitutional:  Negative for fever.   HENT:  Negative for congestion, ear pain and sore throat.    Eyes:  Negative for visual disturbance.   Respiratory:  Negative for cough and shortness of breath.    Cardiovascular:  Negative for chest pain.   Gastrointestinal:  Negative for abdominal pain, nausea and vomiting.   Genitourinary:  Negative for dysuria.   Musculoskeletal:   Negative for back pain and neck pain.   Skin:  Negative for rash.   Neurological:  Positive for headaches. Negative for weakness.   Hematological:  Does not bruise/bleed easily.     Physical Exam     Initial Vitals [12/02/22 1032]   BP Pulse Resp Temp SpO2   (!) 108/54 60 16 98.2 °F (36.8 °C) 98 %      MAP       --         Physical Exam    Nursing note and vitals reviewed.  Constitutional: Vital signs are normal. She appears well-developed and well-nourished.   HENT:   Head: Normocephalic and atraumatic.   Eyes: Pupils are equal, round, and reactive to light.   Neck: Neck supple.   Cardiovascular:  Normal rate, regular rhythm, normal heart sounds and intact distal pulses.     Exam reveals no gallop and no friction rub.       No murmur heard.  Pulmonary/Chest: Breath sounds normal. She has no wheezes. She has no rhonchi. She has no rales.   Abdominal: Abdomen is soft. Bowel sounds are normal. There is no abdominal tenderness.   Musculoskeletal:      Cervical back: Neck supple.     Neurological: She is alert and oriented to person, place, and time. She has normal strength.   Patient is non-verbal on exam. No focal neurological deficits noted. Cranial nerves III-XII grossly intact. Equal rapid alternating movements noted.       Skin: Skin is warm, dry and intact.   Psychiatric: She has a normal mood and affect. Her speech is normal and behavior is normal.       ED Course   Procedures  Labs Reviewed   INFLUENZA A & B BY MOLECULAR   SARS-COV-2 RNA AMPLIFICATION, QUAL   PREGNANCY TEST, URINE RAPID    Narrative:     Specimen Source->Urine   HIV 1 / 2 ANTIBODY    Narrative:     Collection has been rescheduled by SLO at 12/02/2022 11:25 Reason:   The patient mom said they just had blood work done somewhere else so   they want to wait til the dr comes in the room to see of her daughter   need more blood work   HEPATITIS C ANTIBODY    Narrative:     Collection has been rescheduled by SLO at 12/02/2022 11:25 Reason:   The  patient mom said they just had blood work done somewhere else so   they want to wait til the dr comes in the room to see of her daughter   need more blood work   POCT GLUCOSE          Imaging Results              CTA Head and Neck (xpd) (Final result)  Result time 12/02/22 13:31:32      Final result by Steve Morfin MD (12/02/22 13:31:32)                   Impression:      1. No evidence of an acute intracranial abnormality.  2. No high-grade stenosis, large vessel occlusion or aneurysm identified within the head and neck vasculature.  3. Mild right temporomandibular joint degenerative change.  4. Left maxillary molar periapical lucency suggestive of endodontic disease.      Electronically signed by: Steve Morfin  Date:    12/02/2022  Time:    13:31               Narrative:    EXAMINATION:  CTA HEAD AND NECK (XPD)    CLINICAL HISTORY:  Atypical headache;    TECHNIQUE:  Non contrast low dose axial images were obtained through the head. CT angiogram was performed from the level of the jose to the top of the head following the IV administration of 75mL of Omnipaque 350.   Sagittal and coronal reconstructions and maximum intensity projection reconstructions were performed. Arterial stenosis percentages are based on NASCET measurement criteria.    COMPARISON:  None    FINDINGS:  Brain:    No evidence of acute intracranial hemorrhage.    The ventricles and sulci are appropriate in size and configuration for the patient's age without hydrocephalus.    There is no mass effect, midline shift, or extra-axial fluid collection. Gray-white matter differentiation is within normal limits without evidence of an acute major vascular territory infarct.    No abnormal intracranial enhancement.    The visualized orbits are normal. The paranasal sinuses are normal.  The visualized portions of the mastoids are unremarkable. No acute calvarial fracture.    Mild right temporomandibular joint degenerative change.    Extracranial:    Aorta  and great vessels: Left-sided aortic arch with normal configuration. No evidence of stenosis of the origins of the great vessels from the arch.    Subclavian arteries: The subclavian arteries are without hemodynamically significant stenosis.    Right carotid: The right common carotid artery is without significant stenosis.   The right internal carotid artery is without significant stenosis.    Left carotid: The left common carotid artery is without significant stenosis.   The left internal carotid artery is without significant stenosis.    Extracranial vertebral arteries:  The vertebral arteries are without significant stenosis to the skull base.    Intracranial:    Anterior circulation: The course and caliber the internal carotid arteries are normal.  No areas of atherosclerotic narrowing or filling defects are identified.  The middle cerebral arteries are normal.  The anterior cerebral arteries are normal.    Posterior circulation: The posterior cerebral arteries are normal.  The vertebral arteries are normal. The basilar artery is normal.    No evidence of aneurysm or arteriovenous malformation.    Dural venous sinuses are patent.    Other:    Periapical lucency about the left maxillary molar tooth suggestive of endodontic disease.    The visualized portions of the lung apices are unremarkable.    Multilevel degenerative change of the cervical spine, contributing to moderate right neural foraminal stenosis at C4-5 no significant osseous spinal canal stenosis.  No concerning osseous lesions identified.                                       Medications   0.9%  NaCl infusion (0 mLs Intravenous Stopped 12/2/22 1200)   ketorolac injection 9.999 mg (9.999 mg Intravenous Given 12/2/22 1157)   diphenhydrAMINE injection 25 mg (25 mg Intravenous Given 12/2/22 1157)   metoclopramide HCl injection 5 mg (5 mg Intravenous Given 12/2/22 1157)   iohexoL (OMNIPAQUE 350) 350 mg iodine/mL injection (75 mLs Intravenous Given 12/2/22  8064)     Medical Decision Making:   History:   Old Medical Records: I decided to obtain old medical records.  Differential Diagnosis:   Migraine with aura  Sinusitis  Venous sinus thrombosis  Clinical Tests:   Lab Tests: Ordered and Reviewed  Radiological Study: Ordered and Reviewed     APC / Resident Notes:   Patient is a 42 y.o. female who presents to the ED 12/02/2022 who underwent emergent evaluation for headache.  Patient does not typically complain of headaches and is a difficult historian due to being nonverbal.  No focal neurological deficits on exam.  Patient is able to say that the pain is right-sided and frontal and pinpoint.  Family member reports no associated symptoms.  Case is discussed with Dr. Lyons neurology on-call on patient's arrival and recommends migraine cocktail and CTA.  He suggest if CTA is negative and patient's symptoms improve she may follow-up outpatient.  CTA without acute findings and patient's symptoms are resolved with migraine cocktail and IV fluids in the emergency department.  Currently no sign of any acute infectious process requiring antibiotics at this time.  I do not think meningitis.  I do not think other emergent process such as vertebral dissection, CVA, ICH, sinus venous thrombosis, or idiopathic intracranial HTN. Based on my clinical evaluation, I do not appreciate any immediate, emergent, or life threatening condition or etiology that warrants additional workup today and feel that the patient can be discharged with close follow up care.  Follow up and return precautions discussed; patient verbalized understanding and is agreeable to plan of care. Patient discharged home in stable condition.              Scribe Attestation:   Scribe #1: I performed the above scribed service and the documentation accurately describes the services I performed. I attest to the accuracy of the note.    Attending Attestation:           Physician Attestation for Scribe:  Physician  Attestation Statement for Scribe #1: I, Meseret Gates, reviewed documentation, as scribed by in my presence, and it is both accurate and complete.     Comments: I, LUIS Hernandez, personally performed the services described in this documentation. All medical record entries made by the scribe were at my direction and in my presence.  I have reviewed the chart and agree that the record reflects my personal performance and is accurate and complete. LUIS Hernandez.  4:26 PM 12/02/2022      Attending ED Notes:   1108: Spoke with Neurology regarding patient symptoms who recommends CT Head and CTA Head and Neck with migraine cocktail.     ED Course as of 12/02/22 1634   Fri Dec 02, 2022   1230 Called to bedside; caregiver feels she may be having reaction to medication; pt appears slightly anxious and tremulous but calms down when speaking with her; Bi breath sounds clear and respirations even and unlabored. Vital signs normal. She's in no acute distress. She has no rash or facial, lip, or tongue swelling or signs of anaphylaxis.  [JK]   1231 I believe the fluids may be making her feel cold. She would like to go to the bathroom and is able to ambulate in the diallo to the bathroom. [JK]   1234 Pt states she feels better; mother states she hasn't eaten anything. Will check glucose. Pt awake and alert sitting in the chair.   [JK]   1442 Pt states she still feels fine. Caregiver states she has intermittent nausea and requests nausea medicine for home. She does not want anything here.   [JK]      ED Course User Index  [JK] Meseret Gates NP               Clinical Impression:   Final diagnoses:  [R51.9] Acute nonintractable headache, unspecified headache type (Primary)      ED Disposition Condition    Discharge Stable          ED Prescriptions       Medication Sig Dispense Start Date End Date Auth. Provider    ondansetron (ZOFRAN-ODT) 4 MG TbDL Take 1 tablet (4 mg total) by mouth every 6 (six) hours as needed (nausea). 20  tablet 12/2/2022 -- Meseret Gates NP          Follow-up Information       Follow up With Specialties Details Why Contact Info    Yves Mariee MD Family Medicine In 2 days  [address]  [address]  [place] [address]  [phone]      [facility] Emergency Dept Emergency Medicine  As needed, If symptoms worsen [address] [place] [address]  [phone]             Meseret Gates NP  12/02/22 4582

## 2022-12-03 DIAGNOSIS — R76.8 HEPATITIS C ANTIBODY POSITIVE IN BLOOD: Primary | ICD-10-CM

## 2022-12-06 ENCOUNTER — LAB VISIT (OUTPATIENT)
Dept: LAB | Facility: HOSPITAL | Age: 42
End: 2022-12-06
Attending: PHYSICIAN ASSISTANT
Payer: MEDICARE

## 2022-12-06 DIAGNOSIS — R76.8 HEPATITIS C ANTIBODY POSITIVE IN BLOOD: ICD-10-CM

## 2022-12-06 PROCEDURE — 36415 COLL VENOUS BLD VENIPUNCTURE: CPT | Performed by: PHYSICIAN ASSISTANT

## 2022-12-06 PROCEDURE — 87522 HEPATITIS C REVRS TRNSCRPJ: CPT | Performed by: PHYSICIAN ASSISTANT

## 2022-12-07 LAB
HCV RNA SERPL QL NAA+PROBE: NOT DETECTED
HCV RNA SPEC NAA+PROBE-ACNC: <12 IU/ML

## 2023-03-20 ENCOUNTER — TELEPHONE (OUTPATIENT)
Dept: FAMILY MEDICINE | Facility: CLINIC | Age: 43
End: 2023-03-20

## 2023-03-20 DIAGNOSIS — E03.9 ACQUIRED HYPOTHYROIDISM: ICD-10-CM

## 2023-03-20 DIAGNOSIS — E78.49 OTHER HYPERLIPIDEMIA: Primary | ICD-10-CM

## 2023-03-20 DIAGNOSIS — R73.9 HYPERGLYCEMIA: ICD-10-CM

## 2023-03-20 NOTE — TELEPHONE ENCOUNTER
----- Message from Vashti Fritz MA sent at 3/20/2023  8:57 AM CDT -----    ----- Message -----  From: RT Vic  Sent: 3/20/2023   8:09 AM CDT  To: Vashti Fritz MA      ----- Message -----  From: Yves Mariee MD  Sent: 3/20/2023  12:00 AM CDT  To: Yves Mariee Staff    Remind patient to get labs prior to upcoming appointment

## 2023-03-23 ENCOUNTER — PATIENT MESSAGE (OUTPATIENT)
Dept: ADMINISTRATIVE | Facility: HOSPITAL | Age: 43
End: 2023-03-23
Payer: MEDICARE

## 2023-03-23 ENCOUNTER — PATIENT OUTREACH (OUTPATIENT)
Dept: ADMINISTRATIVE | Facility: HOSPITAL | Age: 43
End: 2023-03-23
Payer: MEDICARE

## 2023-03-23 NOTE — PROGRESS NOTES
Cervical Cancer Gap Report Review. [facility] and [facility] reviewed. No new HM items found.    Called patient. No answer. Left message along with contact information to please call back regarding overdue HM    Patient portal message sent regarding overdue HM     Immunization reconciled and reviewed.

## 2023-04-03 ENCOUNTER — PATIENT OUTREACH (OUTPATIENT)
Dept: ADMINISTRATIVE | Facility: HOSPITAL | Age: 43
End: 2023-04-03
Payer: MEDICARE

## 2023-04-03 NOTE — LETTER
April 3, 2023    Catalina Cortesken  [address]  [place] [address]             [facility]  [address]  [place] [address]  Phone: [phone] Dear Ms. Rachell,    Your [facility] is dedicated to helping you stay healthy with regular scheduled recommended screenings.  Scheduling routine screenings is important to maintaining good health. Our records indicate that you may be overdue for your screening pap smear.  Pap smear screening can help identify patients at risk for developing cervical cancer at an early stage, when it is most likely to be successfully treated.    The current recommendation for Pap smear screening is every 3-5 years.  We encourage you to schedule your appointment with your [facility] provider.  Many women see a Gynecologist for this screening but some primary care providers also provide Pap screening.    If you recently had your pap smear screening performed outside of [facility], please let your health care team know so that they can update your health record.     If you have any questions please do not hesitate to call.    Sincerely,    Yves Mariee MD and your [facility]  Primary Care Team    Ton JULIEN  Clinical Care Coordinator    [facility]  [phone] (Phone)  [phone] (Fax)

## 2023-04-03 NOTE — PROGRESS NOTES
"Attempted to outreach patient regarding overdue/ due HM via "[employer]". No response after a week. Now sending outreach via mail out letter.    "

## 2023-04-04 LAB
ALBUMIN SERPL-MCNC: 3.5 G/DL (ref 3.6–5.1)
ALBUMIN/GLOB SERPL: 1 (CALC) (ref 1–2.5)
ALP SERPL-CCNC: 86 U/L (ref 31–125)
ALT SERPL-CCNC: 16 U/L (ref 6–29)
AST SERPL-CCNC: 17 U/L (ref 10–30)
BASOPHILS # BLD AUTO: 49 CELLS/UL (ref 0–200)
BASOPHILS NFR BLD AUTO: 1 %
BILIRUB SERPL-MCNC: 0.5 MG/DL (ref 0.2–1.2)
BUN SERPL-MCNC: 13 MG/DL (ref 7–25)
BUN/CREAT SERPL: ABNORMAL (CALC) (ref 6–22)
CALCIUM SERPL-MCNC: 8.3 MG/DL (ref 8.6–10.2)
CHLORIDE SERPL-SCNC: 105 MMOL/L (ref 98–110)
CHOLEST SERPL-MCNC: 140 MG/DL
CHOLEST/HDLC SERPL: 4 (CALC)
CO2 SERPL-SCNC: 27 MMOL/L (ref 20–32)
CREAT SERPL-MCNC: 0.93 MG/DL (ref 0.5–0.99)
EGFR: 79 ML/MIN/1.73M2
EOSINOPHIL # BLD AUTO: 10 CELLS/UL (ref 15–500)
EOSINOPHIL NFR BLD AUTO: 0.2 %
ERYTHROCYTE [DISTWIDTH] IN BLOOD BY AUTOMATED COUNT: 15.1 % (ref 11–15)
GLOBULIN SER CALC-MCNC: 3.5 G/DL (CALC) (ref 1.9–3.7)
GLUCOSE SERPL-MCNC: 74 MG/DL (ref 65–99)
HBA1C MFR BLD: 5.3 % OF TOTAL HGB
HCT VFR BLD AUTO: 42 % (ref 35–45)
HDLC SERPL-MCNC: 35 MG/DL
HGB BLD-MCNC: 13.5 G/DL (ref 11.7–15.5)
LDLC SERPL CALC-MCNC: 89 MG/DL (CALC)
LYMPHOCYTES # BLD AUTO: 1382 CELLS/UL (ref 850–3900)
LYMPHOCYTES NFR BLD AUTO: 28.2 %
MCH RBC QN AUTO: 29.1 PG (ref 27–33)
MCHC RBC AUTO-ENTMCNC: 32.1 G/DL (ref 32–36)
MCV RBC AUTO: 90.5 FL (ref 80–100)
MONOCYTES # BLD AUTO: 348 CELLS/UL (ref 200–950)
MONOCYTES NFR BLD AUTO: 7.1 %
NEUTROPHILS # BLD AUTO: 3112 CELLS/UL (ref 1500–7800)
NEUTROPHILS NFR BLD AUTO: 63.5 %
NONHDLC SERPL-MCNC: 105 MG/DL (CALC)
PLATELET # BLD AUTO: 184 THOUSAND/UL (ref 140–400)
PMV BLD REES-ECKER: 10.1 FL (ref 7.5–12.5)
POTASSIUM SERPL-SCNC: 4.2 MMOL/L (ref 3.5–5.3)
PROT SERPL-MCNC: 7 G/DL (ref 6.1–8.1)
RBC # BLD AUTO: 4.64 MILLION/UL (ref 3.8–5.1)
SODIUM SERPL-SCNC: 140 MMOL/L (ref 135–146)
T4 FREE SERPL-MCNC: 1.5 NG/DL (ref 0.8–1.8)
TRIGL SERPL-MCNC: 70 MG/DL
TSH SERPL-ACNC: 0.26 MIU/L
WBC # BLD AUTO: 4.9 THOUSAND/UL (ref 3.8–10.8)

## 2023-04-05 ENCOUNTER — OFFICE VISIT (OUTPATIENT)
Dept: FAMILY MEDICINE | Facility: CLINIC | Age: 43
End: 2023-04-05
Payer: MEDICARE

## 2023-04-05 VITALS
DIASTOLIC BLOOD PRESSURE: 64 MMHG | HEART RATE: 60 BPM | SYSTOLIC BLOOD PRESSURE: 106 MMHG | OXYGEN SATURATION: 99 % | WEIGHT: 191.38 LBS | BODY MASS INDEX: 41.29 KG/M2 | HEIGHT: 57 IN

## 2023-04-05 DIAGNOSIS — Z12.31 OTHER SCREENING MAMMOGRAM: ICD-10-CM

## 2023-04-05 DIAGNOSIS — E55.9 VITAMIN D DEFICIENCY: ICD-10-CM

## 2023-04-05 DIAGNOSIS — E78.49 OTHER HYPERLIPIDEMIA: ICD-10-CM

## 2023-04-05 DIAGNOSIS — E03.9 ACQUIRED HYPOTHYROIDISM: Primary | ICD-10-CM

## 2023-04-05 PROCEDURE — 99214 PR OFFICE/OUTPT VISIT, EST, LEVL IV, 30-39 MIN: ICD-10-PCS | Mod: S$GLB,,, | Performed by: FAMILY MEDICINE

## 2023-04-05 PROCEDURE — 99214 OFFICE O/P EST MOD 30 MIN: CPT | Mod: S$GLB,,, | Performed by: FAMILY MEDICINE

## 2023-04-05 RX ORDER — ROSUVASTATIN CALCIUM 20 MG/1
20 TABLET, COATED ORAL DAILY
Qty: 90 TABLET | Refills: 1 | Status: SHIPPED | OUTPATIENT
Start: 2023-04-05 | End: 2023-05-11 | Stop reason: SDUPTHER

## 2023-04-05 RX ORDER — LEVOTHYROXINE SODIUM 88 UG/1
88 TABLET ORAL DAILY
Qty: 90 TABLET | Refills: 1 | Status: SHIPPED | OUTPATIENT
Start: 2023-04-05 | End: 2023-09-27 | Stop reason: SDUPTHER

## 2023-04-05 NOTE — PROGRESS NOTES
SUBJECTIVE:    Patient ID: Catalina Lovett is a 42 y.o. female.    Chief Complaint: Follow-up (6 mo follow up/ discuss lab work/ mammo referral/ slight cough at night x 3 days no fever no n/v/d no headache//mp)    Patient with hypothyroidism and hyperlipidemia presents for visit.  Labs performed are in acceptable range.  Normal A1c.  Normal lipid panel.  Unfortunately TSH is slightly suppressed at 0.26.  T4 is normal.    Patient was seen in the emergency room in December for intractable headache.  A screening labs hepatitis-C antibody was done at that time that was reactive.  She has since had quantitative hep C that was negative.    Patient has been able to maintain weight loss.    Telephone on 03/20/2023   Component Date Value Ref Range Status    TSH w/reflex to FT4 04/03/2023 0.26 (L)  mIU/L Final    T4, Free 04/03/2023 1.5  0.8 - 1.8 ng/dL Final    Glucose 04/03/2023 74  65 - 99 mg/dL Final    BUN 04/03/2023 13  7 - 25 mg/dL Final    Creatinine 04/03/2023 0.93  0.50 - 0.99 mg/dL Final    eGFR 04/03/2023 79  > OR = 60 mL/min/1.73m2 Final    BUN/Creatinine Ratio 04/03/2023 NOT APPLICABLE  6 - 22 (calc) Final    Sodium 04/03/2023 140  135 - 146 mmol/L Final    Potassium 04/03/2023 4.2  3.5 - 5.3 mmol/L Final    Chloride 04/03/2023 105  98 - 110 mmol/L Final    CO2 04/03/2023 27  20 - 32 mmol/L Final    Calcium 04/03/2023 8.3 (L)  8.6 - 10.2 mg/dL Final    Total Protein 04/03/2023 7.0  6.1 - 8.1 g/dL Final    Albumin 04/03/2023 3.5 (L)  3.6 - 5.1 g/dL Final    Globulin, Total 04/03/2023 3.5  1.9 - 3.7 g/dL (calc) Final    Albumin/Globulin Ratio 04/03/2023 1.0  1.0 - 2.5 (calc) Final    Total Bilirubin 04/03/2023 0.5  0.2 - 1.2 mg/dL Final    Alkaline Phosphatase 04/03/2023 86  31 - 125 U/L Final    AST 04/03/2023 17  10 - 30 U/L Final    ALT 04/03/2023 16  6 - 29 U/L Final    Hemoglobin A1C 04/03/2023 5.3  <5.7 % of total Hgb Final    Cholesterol 04/03/2023 140  <200 mg/dL Final    HDL 04/03/2023 35 (L)  > OR  = 50 mg/dL Final    Triglycerides 04/03/2023 70  <150 mg/dL Final    LDL Cholesterol 04/03/2023 89  mg/dL (calc) Final    HDL/Cholesterol Ratio 04/03/2023 4.0  <5.0 (calc) Final    Non HDL Chol. (LDL+VLDL) 04/03/2023 105  <130 mg/dL (calc) Final    WBC 04/03/2023 4.9  3.8 - 10.8 Thousand/uL Final    RBC 04/03/2023 4.64  3.80 - 5.10 Million/uL Final    Hemoglobin 04/03/2023 13.5  11.7 - 15.5 g/dL Final    Hematocrit 04/03/2023 42.0  35.0 - 45.0 % Final    MCV 04/03/2023 90.5  80.0 - 100.0 fL Final    MCH 04/03/2023 29.1  27.0 - 33.0 pg Final    MCHC 04/03/2023 32.1  32.0 - 36.0 g/dL Final    RDW 04/03/2023 15.1 (H)  11.0 - 15.0 % Final    Platelets 04/03/2023 184  140 - 400 Thousand/uL Final    MPV 04/03/2023 10.1  7.5 - 12.5 fL Final    Neutrophils, Abs 04/03/2023 3,112  1,500 - 7,800 cells/uL Final    Lymph # 04/03/2023 1,382  850 - 3,900 cells/uL Final    Mono # 04/03/2023 348  200 - 950 cells/uL Final    Eos # 04/03/2023 10 (L)  15 - 500 cells/uL Final    Baso # 04/03/2023 49  0 - 200 cells/uL Final    Neutrophils Relative 04/03/2023 63.5  % Final    Lymph % 04/03/2023 28.2  % Final    Mono % 04/03/2023 7.1  % Final    Eosinophil % 04/03/2023 0.2  % Final    Basophil % 04/03/2023 1.0  % Final   Lab Visit on 12/06/2022   Component Date Value Ref Range Status    HCV Quantitative Result 12/06/2022 <12  <12 IU/mL Final    HCV, Qualitative 12/06/2022 Not Detected  Not Detected Final   Admission on 12/02/2022, Discharged on 12/02/2022   Component Date Value Ref Range Status    HIV 1/2 Ag/Ab 12/02/2022 Non-reactive  Non-reactive Final    Hepatitis C Ab 12/02/2022 Reactive (A)  Non-reactive Final    Influenza A, Molecular 12/02/2022 Negative  Negative Final    Influenza B, Molecular 12/02/2022 Negative  Negative Final    Flu A & B Source 12/02/2022 Nasal swab   Final    SARS-CoV-2 RNA, Amplification, Qual 12/02/2022 Negative  Negative Final    Preg Test, Ur 12/02/2022 Negative   Final    POCT Glucose 12/02/2022 79  70 -  110 mg/dL Final      Past Medical History:   Diagnosis Date    Down's syndrome     Hyperlipidemia     Hypothyroidism      Past Surgical History:   Procedure Laterality Date    TEAR DUCT SURGERY       Family History   Problem Relation Age of Onset    Hypertension Mother     Diabetes Mother        Marital Status: Single  Alcohol History:  reports no history of alcohol use.  Tobacco History:  reports that she has never smoked. She has never used smokeless tobacco.  Drug History:  reports no history of drug use.    Review of patient's allergies indicates:   Allergen Reactions    Sulfa (sulfonamide antibiotics)        Current Outpatient Medications:     cholecalciferol, vitamin D3, (VITAMIN D3) 50 mcg (2,000 unit) Cap, Take 1 capsule by mouth Daily., Disp: , Rfl:     loratadine (CLARITIN) 10 mg tablet, Take 1 tablet by mouth Daily., Disp: , Rfl:     mupirocin (BACTROBAN) 2 % ointment, Apply topically 2 (two) times daily., Disp: 30 g, Rfl: 3    triamcinolone (KENALOG) 0.5 % ointment, Apply 1 application topically 2 (two) times daily. For rash, Disp: 30 g, Rfl: 1    levothyroxine (SYNTHROID) 88 MCG tablet, Take 1 tablet (88 mcg total) by mouth once daily., Disp: 90 tablet, Rfl: 1    rosuvastatin (CRESTOR) 20 MG tablet, Take 1 tablet (20 mg total) by mouth Daily., Disp: 90 tablet, Rfl: 1    Review of Systems   Constitutional:  Negative for activity change, fatigue and unexpected weight change.   HENT:  Negative for hearing loss, postnasal drip, sinus pressure, sore throat and voice change.    Eyes:  Negative for photophobia and visual disturbance.   Respiratory:  Positive for cough. Negative for shortness of breath and wheezing.    Cardiovascular:  Negative for chest pain and palpitations.   Gastrointestinal:  Negative for constipation, diarrhea and nausea.   Genitourinary:  Negative for difficulty urinating, frequency, hematuria and urgency.   Musculoskeletal:  Negative for arthralgias and back pain.   Skin:  Negative for  "rash.   Neurological:  Negative for weakness, light-headedness and headaches.   Hematological:  Negative for adenopathy. Does not bruise/bleed easily.   Psychiatric/Behavioral:  The patient is not nervous/anxious.         Objective:      Vitals:    04/05/23 1446   BP: 106/64   Pulse: 60   SpO2: 99%   Weight: 86.8 kg (191 lb 6.4 oz)   Height: 4' 9.48" (1.46 m)     Physical Exam  Constitutional:       Appearance: Normal appearance. She is obese.   HENT:      Head: Normocephalic and atraumatic.      Mouth/Throat:      Mouth: Mucous membranes are moist.   Eyes:      Conjunctiva/sclera: Conjunctivae normal.   Cardiovascular:      Rate and Rhythm: Normal rate.   Pulmonary:      Effort: Pulmonary effort is normal.      Breath sounds: Normal breath sounds.   Neurological:      General: No focal deficit present.      Mental Status: She is alert and oriented to person, place, and time.   Psychiatric:         Mood and Affect: Mood normal.         Behavior: Behavior normal.         Assessment:       1. Acquired hypothyroidism    2. Other screening mammogram    3. Other hyperlipidemia    4. Vitamin D deficiency         Plan:       Acquired hypothyroidism  -     levothyroxine (SYNTHROID) 88 MCG tablet; Take 1 tablet (88 mcg total) by mouth once daily.  Dispense: 90 tablet; Refill: 1    Other screening mammogram  -     Mammo Digital Screening Bilat w/ Adriel; Future; Expected date: 04/22/2023    Other hyperlipidemia  -     rosuvastatin (CRESTOR) 20 MG tablet; Take 1 tablet (20 mg total) by mouth Daily.  Dispense: 90 tablet; Refill: 1    Vitamin D deficiency      Follow up in about 6 months (around 10/5/2023) for thyroid.              "

## 2023-04-11 ENCOUNTER — PATIENT MESSAGE (OUTPATIENT)
Dept: ADMINISTRATIVE | Facility: HOSPITAL | Age: 43
End: 2023-04-11
Payer: MEDICARE

## 2023-05-11 DIAGNOSIS — E78.49 OTHER HYPERLIPIDEMIA: ICD-10-CM

## 2023-05-11 RX ORDER — ROSUVASTATIN CALCIUM 20 MG/1
20 TABLET, COATED ORAL DAILY
Qty: 90 TABLET | Refills: 1 | Status: SHIPPED | OUTPATIENT
Start: 2023-05-11 | End: 2023-10-05 | Stop reason: SDUPTHER

## 2023-05-11 NOTE — TELEPHONE ENCOUNTER
----- Message from Ginna Renteria sent at 5/11/2023  4:01 PM CDT -----  Patient mom Gloria called and stated that the patient need a refill of her rosuvastatin called into [facility] on [address]. If any questions please give her a call at [phone]

## 2023-05-11 NOTE — TELEPHONE ENCOUNTER
prescription sent to   [id number] - [place] - [address]  [address] [address]  Phone: [phone] Fax: [phone]

## 2023-07-03 ENCOUNTER — TELEPHONE (OUTPATIENT)
Dept: FAMILY MEDICINE | Facility: CLINIC | Age: 43
End: 2023-07-03

## 2023-07-03 NOTE — TELEPHONE ENCOUNTER
----- Message from Marianne Myers MA sent at 7/3/2023  2:08 PM CDT -----  Regarding: refill  Pt has a refill on her levothyroxine and the pharmacy told her to call the office. Pt needs her [facility]othyroxine sent [facility] [address]. [phone]

## 2023-08-15 ENCOUNTER — TELEPHONE (OUTPATIENT)
Dept: FAMILY MEDICINE | Facility: CLINIC | Age: 43
End: 2023-08-15

## 2023-08-15 NOTE — TELEPHONE ENCOUNTER
prescription sent to   [id number] - [place] - [address] AT [address]  [address] [address]  Phone: [phone] Fax: [phone]     [employer]

## 2023-08-15 NOTE — TELEPHONE ENCOUNTER
----- Message from Orlin Mcbride sent at 8/15/2023  3:14 PM CDT -----  Pts mother states Dr. Mariee was supposed to send pt for a mammogram, but has not been contacted with an appt date as of yet. Mother would like daughter to have a mammogram done asap. [phone]

## 2023-08-15 NOTE — TELEPHONE ENCOUNTER
Order available at [facility].  Called patients [place] and notified, verbalized understanding -DN

## 2023-08-25 ENCOUNTER — HOSPITAL ENCOUNTER (OUTPATIENT)
Dept: RADIOLOGY | Facility: HOSPITAL | Age: 43
Discharge: HOME | End: 2023-08-25
Attending: FAMILY MEDICINE
Payer: MEDICARE

## 2023-08-25 VITALS — BODY MASS INDEX: 40.17 KG/M2 | WEIGHT: 191.38 LBS | HEIGHT: 58 IN

## 2023-08-25 DIAGNOSIS — Z12.31 OTHER SCREENING MAMMOGRAM: ICD-10-CM

## 2023-08-25 PROCEDURE — 77067 SCR MAMMO BI INCL CAD: CPT | Mod: TC,PO

## 2023-09-04 ENCOUNTER — HOSPITAL ENCOUNTER (EMERGENCY)
Facility: HOSPITAL | Age: 43
Discharge: HOME | End: 2023-09-05
Attending: EMERGENCY MEDICINE
Payer: MEDICARE

## 2023-09-04 DIAGNOSIS — B34.9 VIRAL ILLNESS: Primary | ICD-10-CM

## 2023-09-04 DIAGNOSIS — R06.00 DYSPNEA: ICD-10-CM

## 2023-09-04 PROCEDURE — 99284 EMERGENCY DEPT VISIT MOD MDM: CPT

## 2023-09-05 VITALS
HEART RATE: 62 BPM | HEIGHT: 60 IN | TEMPERATURE: 98 F | OXYGEN SATURATION: 96 % | DIASTOLIC BLOOD PRESSURE: 59 MMHG | BODY MASS INDEX: 37.3 KG/M2 | WEIGHT: 190 LBS | RESPIRATION RATE: 16 BRPM | SYSTOLIC BLOOD PRESSURE: 102 MMHG

## 2023-09-05 VITALS
HEIGHT: 60 IN | RESPIRATION RATE: 17 BRPM | OXYGEN SATURATION: 97 % | TEMPERATURE: 99 F | WEIGHT: 190 LBS | DIASTOLIC BLOOD PRESSURE: 52 MMHG | BODY MASS INDEX: 37.3 KG/M2 | SYSTOLIC BLOOD PRESSURE: 98 MMHG | HEART RATE: 61 BPM

## 2023-09-05 DIAGNOSIS — R11.2 NAUSEA AND VOMITING, UNSPECIFIED VOMITING TYPE: Primary | ICD-10-CM

## 2023-09-05 LAB
ALBUMIN SERPL BCP-MCNC: 3.5 G/DL (ref 3.5–5.2)
ALBUMIN SERPL BCP-MCNC: 3.7 G/DL (ref 3.5–5.2)
ALP SERPL-CCNC: 82 U/L (ref 55–135)
ALP SERPL-CCNC: 88 U/L (ref 55–135)
ALT SERPL W/O P-5'-P-CCNC: 18 U/L (ref 10–44)
ALT SERPL W/O P-5'-P-CCNC: 19 U/L (ref 10–44)
ANION GAP SERPL CALC-SCNC: 6 MMOL/L (ref 8–16)
ANION GAP SERPL CALC-SCNC: 8 MMOL/L (ref 8–16)
AST SERPL-CCNC: 24 U/L (ref 10–40)
AST SERPL-CCNC: 26 U/L (ref 10–40)
B-HCG UR QL: NEGATIVE
BASOPHILS # BLD AUTO: 0.05 K/UL (ref 0–0.2)
BASOPHILS # BLD AUTO: 0.07 K/UL (ref 0–0.2)
BASOPHILS NFR BLD: 0.7 % (ref 0–1.9)
BASOPHILS NFR BLD: 1.2 % (ref 0–1.9)
BILIRUB SERPL-MCNC: 0.6 MG/DL (ref 0.1–1)
BILIRUB SERPL-MCNC: 0.7 MG/DL (ref 0.1–1)
BILIRUB UR QL STRIP: NEGATIVE
BILIRUB UR QL STRIP: NEGATIVE
BNP SERPL-MCNC: 28 PG/ML (ref 0–99)
BUN SERPL-MCNC: 10 MG/DL (ref 6–20)
BUN SERPL-MCNC: 11 MG/DL (ref 6–20)
CALCIUM SERPL-MCNC: 8.8 MG/DL (ref 8.7–10.5)
CALCIUM SERPL-MCNC: 8.9 MG/DL (ref 8.7–10.5)
CHLORIDE SERPL-SCNC: 102 MMOL/L (ref 95–110)
CHLORIDE SERPL-SCNC: 98 MMOL/L (ref 95–110)
CLARITY UR: CLEAR
CLARITY UR: CLEAR
CO2 SERPL-SCNC: 24 MMOL/L (ref 23–29)
CO2 SERPL-SCNC: 25 MMOL/L (ref 23–29)
COLOR UR: COLORLESS
COLOR UR: COLORLESS
CREAT SERPL-MCNC: 0.9 MG/DL (ref 0.5–1.4)
CREAT SERPL-MCNC: 1 MG/DL (ref 0.5–1.4)
CTP QC/QA: YES
DIFFERENTIAL METHOD: ABNORMAL
DIFFERENTIAL METHOD: ABNORMAL
EOSINOPHIL # BLD AUTO: 0 K/UL (ref 0–0.5)
EOSINOPHIL # BLD AUTO: 0 K/UL (ref 0–0.5)
EOSINOPHIL NFR BLD: 0.3 % (ref 0–8)
EOSINOPHIL NFR BLD: 0.3 % (ref 0–8)
ERYTHROCYTE [DISTWIDTH] IN BLOOD BY AUTOMATED COUNT: 14.8 % (ref 11.5–14.5)
ERYTHROCYTE [DISTWIDTH] IN BLOOD BY AUTOMATED COUNT: 14.9 % (ref 11.5–14.5)
EST. GFR  (NO RACE VARIABLE): >60 ML/MIN/1.73 M^2
EST. GFR  (NO RACE VARIABLE): >60 ML/MIN/1.73 M^2
GLUCOSE SERPL-MCNC: 100 MG/DL (ref 70–110)
GLUCOSE SERPL-MCNC: 83 MG/DL (ref 70–110)
GLUCOSE UR QL STRIP: NEGATIVE
GLUCOSE UR QL STRIP: NEGATIVE
HCT VFR BLD AUTO: 41.1 % (ref 37–48.5)
HCT VFR BLD AUTO: 44 % (ref 37–48.5)
HGB BLD-MCNC: 13.6 G/DL (ref 12–16)
HGB BLD-MCNC: 14.4 G/DL (ref 12–16)
HGB UR QL STRIP: NEGATIVE
HGB UR QL STRIP: NEGATIVE
IMM GRANULOCYTES # BLD AUTO: 0.02 K/UL (ref 0–0.04)
IMM GRANULOCYTES # BLD AUTO: 0.03 K/UL (ref 0–0.04)
IMM GRANULOCYTES NFR BLD AUTO: 0.3 % (ref 0–0.5)
IMM GRANULOCYTES NFR BLD AUTO: 0.4 % (ref 0–0.5)
INFLUENZA A, MOLECULAR: NEGATIVE
INFLUENZA B, MOLECULAR: NEGATIVE
KETONES UR QL STRIP: NEGATIVE
KETONES UR QL STRIP: NEGATIVE
LEUKOCYTE ESTERASE UR QL STRIP: NEGATIVE
LEUKOCYTE ESTERASE UR QL STRIP: NEGATIVE
LIPASE SERPL-CCNC: 28 U/L (ref 4–60)
LYMPHOCYTES # BLD AUTO: 1 K/UL (ref 1–4.8)
LYMPHOCYTES # BLD AUTO: 1.5 K/UL (ref 1–4.8)
LYMPHOCYTES NFR BLD: 14.1 % (ref 18–48)
LYMPHOCYTES NFR BLD: 26 % (ref 18–48)
MCH RBC QN AUTO: 30.3 PG (ref 27–31)
MCH RBC QN AUTO: 30.3 PG (ref 27–31)
MCHC RBC AUTO-ENTMCNC: 32.7 G/DL (ref 32–36)
MCHC RBC AUTO-ENTMCNC: 33.1 G/DL (ref 32–36)
MCV RBC AUTO: 92 FL (ref 82–98)
MCV RBC AUTO: 93 FL (ref 82–98)
MONOCYTES # BLD AUTO: 0.4 K/UL (ref 0.3–1)
MONOCYTES # BLD AUTO: 0.6 K/UL (ref 0.3–1)
MONOCYTES NFR BLD: 7.3 % (ref 4–15)
MONOCYTES NFR BLD: 7.5 % (ref 4–15)
NEUTROPHILS # BLD AUTO: 3.9 K/UL (ref 1.8–7.7)
NEUTROPHILS # BLD AUTO: 5.7 K/UL (ref 1.8–7.7)
NEUTROPHILS NFR BLD: 64.9 % (ref 38–73)
NEUTROPHILS NFR BLD: 77 % (ref 38–73)
NITRITE UR QL STRIP: NEGATIVE
NITRITE UR QL STRIP: NEGATIVE
NRBC BLD-RTO: 0 /100 WBC
NRBC BLD-RTO: 0 /100 WBC
PH UR STRIP: 6 [PH] (ref 5–8)
PH UR STRIP: 6 [PH] (ref 5–8)
PLATELET # BLD AUTO: 196 K/UL (ref 150–450)
PLATELET # BLD AUTO: 219 K/UL (ref 150–450)
PMV BLD AUTO: 9.5 FL (ref 9.2–12.9)
PMV BLD AUTO: 9.7 FL (ref 9.2–12.9)
POTASSIUM SERPL-SCNC: 3.6 MMOL/L (ref 3.5–5.1)
POTASSIUM SERPL-SCNC: 3.8 MMOL/L (ref 3.5–5.1)
PROT SERPL-MCNC: 7.8 G/DL (ref 6–8.4)
PROT SERPL-MCNC: 8.5 G/DL (ref 6–8.4)
PROT UR QL STRIP: NEGATIVE
PROT UR QL STRIP: NEGATIVE
RBC # BLD AUTO: 4.49 M/UL (ref 4–5.4)
RBC # BLD AUTO: 4.75 M/UL (ref 4–5.4)
SARS-COV-2 RDRP RESP QL NAA+PROBE: NEGATIVE
SODIUM SERPL-SCNC: 130 MMOL/L (ref 136–145)
SODIUM SERPL-SCNC: 133 MMOL/L (ref 136–145)
SP GR UR STRIP: 1 (ref 1–1.03)
SP GR UR STRIP: 1 (ref 1–1.03)
SPECIMEN SOURCE: NORMAL
TROPONIN I SERPL HS-MCNC: 5.6 PG/ML (ref 0–14.9)
URN SPEC COLLECT METH UR: ABNORMAL
URN SPEC COLLECT METH UR: ABNORMAL
UROBILINOGEN UR STRIP-ACNC: NEGATIVE EU/DL
UROBILINOGEN UR STRIP-ACNC: NEGATIVE EU/DL
WBC # BLD AUTO: 5.93 K/UL (ref 3.9–12.7)
WBC # BLD AUTO: 7.36 K/UL (ref 3.9–12.7)

## 2023-09-05 PROCEDURE — 80053 COMPREHEN METABOLIC PANEL: CPT | Mod: 91 | Performed by: EMERGENCY MEDICINE

## 2023-09-05 PROCEDURE — 25000003 PHARM REV CODE 250: Performed by: EMERGENCY MEDICINE

## 2023-09-05 PROCEDURE — 25500020 PHARM REV CODE 255: Performed by: EMERGENCY MEDICINE

## 2023-09-05 PROCEDURE — 93005 ELECTROCARDIOGRAM TRACING: CPT | Performed by: GENERAL PRACTICE

## 2023-09-05 PROCEDURE — 85025 COMPLETE CBC W/AUTO DIFF WBC: CPT | Performed by: EMERGENCY MEDICINE

## 2023-09-05 PROCEDURE — 63600175 PHARM REV CODE 636 W HCPCS: Performed by: EMERGENCY MEDICINE

## 2023-09-05 PROCEDURE — 81003 URINALYSIS AUTO W/O SCOPE: CPT | Performed by: EMERGENCY MEDICINE

## 2023-09-05 PROCEDURE — 96375 TX/PRO/DX INJ NEW DRUG ADDON: CPT

## 2023-09-05 PROCEDURE — 87502 INFLUENZA DNA AMP PROBE: CPT | Performed by: EMERGENCY MEDICINE

## 2023-09-05 PROCEDURE — 83880 ASSAY OF NATRIURETIC PEPTIDE: CPT | Performed by: EMERGENCY MEDICINE

## 2023-09-05 PROCEDURE — 81003 URINALYSIS AUTO W/O SCOPE: CPT | Mod: 91 | Performed by: EMERGENCY MEDICINE

## 2023-09-05 PROCEDURE — U0002 COVID-19 LAB TEST NON-CDC: HCPCS | Performed by: EMERGENCY MEDICINE

## 2023-09-05 PROCEDURE — 85025 COMPLETE CBC W/AUTO DIFF WBC: CPT | Mod: 91 | Performed by: EMERGENCY MEDICINE

## 2023-09-05 PROCEDURE — 96374 THER/PROPH/DIAG INJ IV PUSH: CPT

## 2023-09-05 PROCEDURE — 81025 URINE PREGNANCY TEST: CPT | Performed by: EMERGENCY MEDICINE

## 2023-09-05 PROCEDURE — 80053 COMPREHEN METABOLIC PANEL: CPT | Performed by: EMERGENCY MEDICINE

## 2023-09-05 PROCEDURE — 96361 HYDRATE IV INFUSION ADD-ON: CPT

## 2023-09-05 PROCEDURE — 83690 ASSAY OF LIPASE: CPT | Performed by: EMERGENCY MEDICINE

## 2023-09-05 PROCEDURE — 93010 ELECTROCARDIOGRAM REPORT: CPT | Mod: ,,, | Performed by: GENERAL PRACTICE

## 2023-09-05 PROCEDURE — 93010 EKG 12-LEAD: ICD-10-PCS | Mod: ,,, | Performed by: GENERAL PRACTICE

## 2023-09-05 PROCEDURE — C9113 INJ PANTOPRAZOLE SODIUM, VIA: HCPCS | Performed by: EMERGENCY MEDICINE

## 2023-09-05 PROCEDURE — 99285 EMERGENCY DEPT VISIT HI MDM: CPT | Mod: 25

## 2023-09-05 PROCEDURE — 84484 ASSAY OF TROPONIN QUANT: CPT | Performed by: EMERGENCY MEDICINE

## 2023-09-05 RX ORDER — ONDANSETRON 4 MG/1
4 TABLET, FILM COATED ORAL EVERY 6 HOURS
Qty: 12 TABLET | Refills: 0 | Status: SHIPPED | OUTPATIENT
Start: 2023-09-05 | End: 2023-10-05

## 2023-09-05 RX ORDER — ONDANSETRON 2 MG/ML
4 INJECTION INTRAMUSCULAR; INTRAVENOUS
Status: COMPLETED | OUTPATIENT
Start: 2023-09-05 | End: 2023-09-05

## 2023-09-05 RX ORDER — PANTOPRAZOLE SODIUM 40 MG/10ML
40 INJECTION, POWDER, LYOPHILIZED, FOR SOLUTION INTRAVENOUS
Status: COMPLETED | OUTPATIENT
Start: 2023-09-05 | End: 2023-09-05

## 2023-09-05 RX ADMIN — ONDANSETRON 4 MG: 2 INJECTION INTRAMUSCULAR; INTRAVENOUS at 07:09

## 2023-09-05 RX ADMIN — PANTOPRAZOLE SODIUM 40 MG: 40 INJECTION, POWDER, LYOPHILIZED, FOR SOLUTION INTRAVENOUS at 07:09

## 2023-09-05 RX ADMIN — SODIUM CHLORIDE 1000 ML: 0.9 INJECTION, SOLUTION INTRAVENOUS at 07:09

## 2023-09-05 RX ADMIN — IOHEXOL 100 ML: 350 INJECTION, SOLUTION INTRAVENOUS at 10:09

## 2023-09-05 NOTE — ED PROVIDER NOTES
Encounter Date: 9/4/2023       History   No chief complaint on file.    43-year-old female presents emergency department with congestion, headache, nausea vomiting, diarrhea.  Patient says that tonight all the sudden she started to have chills, mother says that she started shaking and became short of breath with a chills.  She gave her some Tylenol at about 5:30 p.m..  She says that she gave her ibuprofen at 10:30 a.m. on an empty stomach.  Patient vomited x1.  Patient has been having some loose stools today.  Patient also complaining of a headache.  She says that she does not have a sore throat she does not have any chest pain or any shortness of breath currently.  No abdominal pain.  She denies any urinary complaints such as frequency urgency or burning.      Review of patient's allergies indicates:   Allergen Reactions    Sulfa (sulfonamide antibiotics)      Past Medical History:   Diagnosis Date    Down's syndrome     Hyperlipidemia     Hypothyroidism      Past Surgical History:   Procedure Laterality Date    TEAR DUCT SURGERY       Family History   Problem Relation Age of Onset    Hypertension Mother     Diabetes Mother      Social History     Tobacco Use    Smoking status: Never    Smokeless tobacco: Never   Substance Use Topics    Alcohol use: Never    Drug use: Never     Review of Systems   Constitutional:  Positive for chills. Negative for fever.   HENT:  Negative for sore throat.    Respiratory:  Positive for shortness of breath (earlier today while she had chills but gone now).    Cardiovascular:  Negative for chest pain and palpitations.   Gastrointestinal:  Positive for diarrhea, nausea and vomiting. Negative for abdominal pain.   Genitourinary:  Negative for dysuria and flank pain.   Musculoskeletal:  Negative for back pain.   Skin:  Negative for rash.   Neurological:  Negative for weakness.   Hematological:  Does not bruise/bleed easily.   All other systems reviewed and are negative.      Physical  Exam     Initial Vitals [09/04/23 2346]   BP Pulse Resp Temp SpO2   115/65 70 20 98 °F (36.7 °C) 98 %      MAP       --         Physical Exam    Nursing note and vitals reviewed.  Constitutional: She appears well-developed and well-nourished. No distress.   HENT:   Head: Normocephalic and atraumatic.   Mouth/Throat: No oropharyngeal exudate.   Dry mucous membrane   Eyes: Conjunctivae and EOM are normal. Pupils are equal, round, and reactive to light.   Neck: Neck supple. No tracheal deviation present.   Normal range of motion.  Cardiovascular:  Normal rate, regular rhythm, normal heart sounds and intact distal pulses.           No murmur heard.  Pulmonary/Chest: Breath sounds normal. No stridor. No respiratory distress. She has no wheezes. She has no rhonchi. She has no rales.   Abdominal: Abdomen is soft. She exhibits no distension. There is no abdominal tenderness. There is no rebound.   Musculoskeletal:         General: No tenderness or edema. Normal range of motion.      Cervical back: Normal range of motion and neck supple.     Neurological: She is alert and oriented to person, place, and time. She has normal strength. No cranial nerve deficit or sensory deficit. GCS score is 15. GCS eye subscore is 4. GCS verbal subscore is 5. GCS motor subscore is 6.   Skin: Skin is warm and dry. Capillary refill takes less than 2 seconds. No rash noted. No erythema. No pallor.   Psychiatric: She has a normal mood and affect. Thought content normal.         ED Course   Procedures  Labs Reviewed   URINALYSIS, REFLEX TO URINE CULTURE - Abnormal; Notable for the following components:       Result Value    Color, UA Colorless (*)     All other components within normal limits    Narrative:     Specimen Source->Urine   CBC W/ AUTO DIFFERENTIAL - Abnormal; Notable for the following components:    RDW 14.9 (*)     All other components within normal limits   COMPREHENSIVE METABOLIC PANEL - Abnormal; Notable for the following  components:    Sodium 133 (*)     Total Protein 8.5 (*)     Anion Gap 6 (*)     All other components within normal limits   SARS-COV-2 RNA AMPLIFICATION, QUAL   INFLUENZA A AND B ANTIGEN    Narrative:     Specimen Source->Nasopharyngeal Swab   B-TYPE NATRIURETIC PEPTIDE   TROPONIN I HIGH SENSITIVITY   POCT URINE PREGNANCY        Results for orders placed or performed during the hospital encounter of 09/04/23   COVID-19 Rapid Screening   Result Value Ref Range    SARS-CoV-2 RNA, Amplification, Qual Negative Negative   Influenza antigen Nasopharyngeal Swab   Result Value Ref Range    Influenza A, Molecular Negative Negative    Influenza B, Molecular Negative Negative    Flu A & B Source Nasal swab    Urinalysis, Reflex to Urine Culture Urine, Clean Catch    Specimen: Urine   Result Value Ref Range    Specimen UA Urine, Clean Catch     Color, UA Colorless (A) Yellow, Straw, Brianna    Appearance, UA Clear Clear    pH, UA 6.0 5.0 - 8.0    Specific Gravity, UA 1.005 1.005 - 1.030    Protein, UA Negative Negative    Glucose, UA Negative Negative    Ketones, UA Negative Negative    Bilirubin (UA) Negative Negative    Occult Blood UA Negative Negative    Nitrite, UA Negative Negative    Urobilinogen, UA Negative Negative EU/dL    Leukocytes, UA Negative Negative   CBC auto differential   Result Value Ref Range    WBC 5.93 3.90 - 12.70 K/uL    RBC 4.75 4.00 - 5.40 M/uL    Hemoglobin 14.4 12.0 - 16.0 g/dL    Hematocrit 44.0 37.0 - 48.5 %    MCV 93 82 - 98 fL    MCH 30.3 27.0 - 31.0 pg    MCHC 32.7 32.0 - 36.0 g/dL    RDW 14.9 (H) 11.5 - 14.5 %    Platelets 219 150 - 450 K/uL    MPV 9.7 9.2 - 12.9 fL    Immature Granulocytes 0.3 0.0 - 0.5 %    Gran # (ANC) 3.9 1.8 - 7.7 K/uL    Immature Grans (Abs) 0.02 0.00 - 0.04 K/uL    Lymph # 1.5 1.0 - 4.8 K/uL    Mono # 0.4 0.3 - 1.0 K/uL    Eos # 0.0 0.0 - 0.5 K/uL    Baso # 0.07 0.00 - 0.20 K/uL    nRBC 0 0 /100 WBC    Gran % 64.9 38.0 - 73.0 %    Lymph % 26.0 18.0 - 48.0 %    Mono % 7.3  4.0 - 15.0 %    Eosinophil % 0.3 0.0 - 8.0 %    Basophil % 1.2 0.0 - 1.9 %    Differential Method Automated    Comprehensive metabolic panel   Result Value Ref Range    Sodium 133 (L) 136 - 145 mmol/L    Potassium 3.8 3.5 - 5.1 mmol/L    Chloride 102 95 - 110 mmol/L    CO2 25 23 - 29 mmol/L    Glucose 83 70 - 110 mg/dL    BUN 11 6 - 20 mg/dL    Creatinine 1.0 0.5 - 1.4 mg/dL    Calcium 8.9 8.7 - 10.5 mg/dL    Total Protein 8.5 (H) 6.0 - 8.4 g/dL    Albumin 3.7 3.5 - 5.2 g/dL    Total Bilirubin 0.6 0.1 - 1.0 mg/dL    Alkaline Phosphatase 88 55 - 135 U/L    AST 26 10 - 40 U/L    ALT 19 10 - 44 U/L    eGFR >60.0 >60 mL/min/1.73 m^2    Anion Gap 6 (L) 8 - 16 mmol/L   Brain natriuretic peptide   Result Value Ref Range    BNP 28 0 - 99 pg/mL   Troponin I High Sensitivity   Result Value Ref Range    Troponin I High Sensitivity 5.6 0.0 - 14.9 pg/mL   POCT urine pregnancy   Result Value Ref Range    POC Preg Test, Ur Negative Negative     Acceptable Yes          Imaging Results              X-Ray Chest AP Portable (Preliminary result)  Result time 09/05/23 00:29:53   Procedure changed from X-Ray Chest PA And Lateral     Wet Read by Kirit Mcguire DO (09/05/23 00:29:53, [facility] - Emergency Dept, Emergency Medicine)    Poor inspiratory effort, no acute process                                     Medications - No data to display  Medical Decision Making  Differential includes but not limited to viral illness, COVID, flu, electrolyte abnormality, less suspicious for any PE, pneumonia, pneumothorax, ACS,    Emergent evaluation 43-year-old female presents emergency department with viral type syndrome of cough, chills, headache, not feeling well.  Patient emergency department tested negative for COVID and influenza.  Labs are unremarkable and reassuring, troponin is negative, EKG is nonischemic.  BNP is negative.  Labs including electrolytes and anemia screen is unremarkable, hemoglobin hematocrit  her normal.  Patient likely has a viral illness may have early COVID.  Symptoms just started tonight.  I recommend retesting for COVID in 2 days.  Patient will return if symptoms change or worsen.  Mother comfortable with the plan.    A dictation software program was used for this note.  Please expect some simple typographical  errors in this note.    I had a detailed discussion with the patient and/or guardian regarding: The historical points, exam findings, and diagnostic results supporting the discharge diagnosis, lab results, pertinent radiology results, and the need for outpatient follow-up, for definitive care with a family practitioner and to return to the emergency department if symptoms worsen or persist or if there are any questions or concerns that arise at home. All questions have been answered in detail. Strict return to Emergency Department precautions have been provided.       Amount and/or Complexity of Data Reviewed  Labs: ordered.  Radiology: ordered and independent interpretation performed.      Additional MDM:   PERC Rule:   Age is greater than or equal to 50 = 0.0  Heart Rate is greater than or equal to 100 = 0.0  SaO2 on room air < 95% = 0.0  Unilateral leg swelling = 0.0  Hemoptysis = 0.0  Recent surgery or trauma = 0.0  Prior PE or DVT =  0.0  Hormone use = 0.00  PERC Score = 0      Heart Score:    History:          Slightly suspicious.  ECG:             Normal  Age:               Less than 45 years  Risk factors: 1-2 risk factors  Troponin:       Less than or equal to normal limit  Heart Score = 1                ED Course as of 09/05/23 0235   Tue Sep 05, 2023   0019 EKG 12:11 a.m. normal sinus rhythm rate of 62.  No ST elevation or depression.  No STEMI.  EKG interpreted independently by me. [JR]      ED Course User Index  [JR] Kirit Mcguire DO                    Clinical Impression:   Final diagnoses:  [R06.00] Dyspnea  [B34.9] Viral illness (Primary)        ED Disposition Condition     Discharge Stable          ED Prescriptions    None       Follow-up Information       Follow up With Specialties Details Why Contact Info Additional Information    Yves Mariee MD Family Medicine In 3 days  [address]  [address]  [facility] [address]  [phone]       [facility] - Emergency Dept Emergency Medicine  If symptoms worsen [address] [facility] [facility] [address]  [phone] 1st floor             Kirit Mcguire DO  09/05/23 0235

## 2023-09-05 NOTE — ED PROVIDER NOTES
Encounter Date: 9/5/2023       History     Chief Complaint   Patient presents with    Vomiting     Patient vomiting red emesis, pt was d/c from ER this AM, symptoms worsened.     Patient presents complaining of nausea vomiting.  Patient was seen in the ER this morning with similar symptoms.  She denies abdominal pain.  At the worst symptoms are moderate.  History is somewhat challenging as she has Down syndrome.  Caretaker concern because recurring vomiting this morning.      Review of patient's allergies indicates:   Allergen Reactions    Sulfa (sulfonamide antibiotics)      Past Medical History:   Diagnosis Date    Down's syndrome     Hyperlipidemia     Hypothyroidism      Past Surgical History:   Procedure Laterality Date    TEAR DUCT SURGERY       Family History   Problem Relation Age of Onset    Hypertension Mother     Diabetes Mother      Social History     Tobacco Use    Smoking status: Never    Smokeless tobacco: Never   Substance Use Topics    Alcohol use: Never    Drug use: Never     Review of Systems   All other systems reviewed and are negative.      Physical Exam     Initial Vitals [09/05/23 0722]   BP Pulse Resp Temp SpO2   123/74 66 18 98.9 °F (37.2 °C) 99 %      MAP       --         Physical Exam    Nursing note and vitals reviewed.  Constitutional: She appears well-developed and well-nourished.   Pleasant, polite   HENT:   Head: Normocephalic and atraumatic.   Dentition are with cavities present.  There is no obvious swelling to the cheek or specific abscess formation   Eyes: EOM are normal.   Neck: Neck supple.   Normal range of motion.  Cardiovascular:  Normal rate, regular rhythm, normal heart sounds and intact distal pulses.           Pulmonary/Chest: Breath sounds normal. No respiratory distress.   Abdominal: Abdomen is soft. She exhibits no distension. There is no abdominal tenderness. There is no rebound and no guarding.   Musculoskeletal:         General: Normal range of motion.       Cervical back: Normal range of motion and neck supple.     Neurological: She is alert and oriented to person, place, and time. She has normal strength.   Skin: Skin is warm and dry. Capillary refill takes less than 2 seconds.   Psychiatric: She has a normal mood and affect. Her behavior is normal. Judgment and thought content normal.         ED Course   Procedures  Labs Reviewed   CBC W/ AUTO DIFFERENTIAL - Abnormal; Notable for the following components:       Result Value    RDW 14.8 (*)     Gran % 77.0 (*)     Lymph % 14.1 (*)     All other components within normal limits   COMPREHENSIVE METABOLIC PANEL - Abnormal; Notable for the following components:    Sodium 130 (*)     All other components within normal limits   URINALYSIS, REFLEX TO URINE CULTURE - Abnormal; Notable for the following components:    Color, UA Colorless (*)     All other components within normal limits    Narrative:     Specimen Source->Urine   LIPASE          Imaging Results              CT Abdomen Pelvis With Contrast (Final result)  Result time 09/05/23 11:03:32      Final result by Makenna Arroyo MD (09/05/23 11:03:32)                   Narrative:    EXAMINATION:  CT ABDOMEN PELVIS WITH CONTRAST    CLINICAL INDICATION: Female, 43 years old. Abdominal abscess/infection suspected    TECHNIQUE: Helical CT scan examination of the abdomen and pelvis is performed from the domes of the diaphragm to the pubic symphysis with the administration of intravenous contrast material.    CONTRAST: 100 mL mL of Omnipaque 350 IV.    COMPARISON: None    FINDINGS:  Lower Chest: Visualized lung bases are clear. Heart is normal in size. No pericardial or pleural effusion.    Liver: Normal in size and contour. No focal lesion.    Bile Ducts: Normal caliber.    Gallbladder: No stones, wall thickening, or pericholecystic fluid.    Pancreas: Normal appearance without focal lesion.    Spleen: Normal in size and contour.    Adrenals: Normal  configuration.    Kidneys and Ureters: Normal size and contour. No hydronephrosis.    Bladder: Normal in appearance.    Bowel:  Stomach: Unremarkable.  Small Intestine: Unremarkable.  Appendix: No inflammatory changes.  Colon: Unremarkable.    Vessels: Abdominal aorta and inferior vena cava are normal in course and caliber.    Reproductive Organs:    Lymph Nodes: No pathologic mesenteric or retroperitoneal lymph nodes.    Peritoneum: No free air, free fluid, or fluid collection.    Abdominal Wall: No hernia or mass.    Musculoskeletal: No acute abnormality or suspicious bony lesion.    IMPRESSION:  No acute or significant abnormality seen in the abdomen or pelvis.    This exam was performed according to our departmental dose-optimization program which includes automated exposure control, adjustment of the mA and/or kV according to patient size and/or use of iterative reconstruction technique.    Electronically signed by:  Makenna Arroyo MD  9/5/2023 11:03 AM CDT Workstation: [id number]                                     Medications   sodium chloride 0.9% bolus 1,000 mL 1,000 mL (0 mLs Intravenous Stopped 9/5/23 0857)   ondansetron injection 4 mg (4 mg Intravenous Given 9/5/23 0757)   pantoprazole injection 40 mg (40 mg Intravenous Given 9/5/23 0757)   iohexoL (OMNIPAQUE 350) injection 100 mL (100 mLs Intravenous Given 9/5/23 1042)     Medical Decision Making  Patient appears in no acute distress    Considerations include but not limited to electrolyte abnormalities, dehydration, acute kidney injury, less likely surgical process     In the emergency department patient found to have normal CT scan.  Patient improved after IV Phenergan and fluids.  No further vomiting.  Blood work reviewed and within normal limits.  Patient appears to have had a gastritis.  Additional concern from caretaker about possible caries.      MDM    Patient presents for emergent evaluation of acute vomiting that poses a threat to life and/or  bodily function.    In the ED patient found to have acute nausea vomiting.    I ordered labs and personally reviewed them.  Labs significant for no acute abnormality.    I ordered CT scan and personally reviewed it and reviewed the radiologist interpretation.  CT significant for no acute abnormality.      Discharge MDM    Patient was managed in the ED with IV normal saline, Phenergan.    The response to treatment was improved.    Patient was discharged in stable condition.  Detailed return precautions discussed.    Amount and/or Complexity of Data Reviewed  Labs: ordered.  Radiology: ordered.    Risk  Prescription drug management.                               Clinical Impression:   Final diagnoses:  [R11.2] Nausea and vomiting, unspecified vomiting type (Primary)        ED Disposition Condition    Discharge Stable          ED Prescriptions       Medication Sig Dispense Start Date End Date Auth. Provider    ondansetron (ZOFRAN) 4 MG tablet Take 1 tablet (4 mg total) by mouth every 6 (six) hours. 12 tablet 9/5/2023 -- Colt Chavez MD          Follow-up Information       Follow up With Specialties Details Why Contact Info    Yves Mariee MD Family Medicine Schedule an appointment as soon as possible for a visit in 2 days  [address] [facility]  [address] [place] [address]  [phone]               Colt Chavez MD  09/05/23 1129

## 2023-09-05 NOTE — DISCHARGE INSTRUCTIONS
RETURN TO EMERGENCY DEPARTMENT WITHOUT FAIL, IF YOUR SYMPTOMS WORSEN, IF YOU GET NEW OR DIFFERENT SYMPTOMS, IF YOU ARE UNABLE TO FOLLOW UP AS DIRECTED, OR IF YOU HAVE ANY CONCERNS OR WORRIES.    Take Tylenol and ibuprofen as needed for symptoms.  Return if symptoms change or worsen.

## 2023-09-27 DIAGNOSIS — E03.9 ACQUIRED HYPOTHYROIDISM: ICD-10-CM

## 2023-09-27 RX ORDER — LEVOTHYROXINE SODIUM 88 UG/1
88 TABLET ORAL DAILY
Qty: 90 TABLET | Refills: 1 | Status: SHIPPED | OUTPATIENT
Start: 2023-09-27 | End: 2024-02-29 | Stop reason: SDUPTHER

## 2023-09-27 NOTE — TELEPHONE ENCOUNTER
----- Message from Lorrie Mir sent at 9/27/2023  2:03 PM CDT -----  Pt needs refill on levothyroxine   [facility]   [phone]

## 2023-09-28 NOTE — TELEPHONE ENCOUNTER
The patient's prescription has been approved and sent to   [id number] - [place], [place] - [address] [facility] [address]  [address] [address]  Phone: [phone] Fax: [phone]

## 2023-10-05 ENCOUNTER — OFFICE VISIT (OUTPATIENT)
Dept: FAMILY MEDICINE | Facility: CLINIC | Age: 43
End: 2023-10-05
Attending: FAMILY MEDICINE
Payer: MEDICARE

## 2023-10-05 VITALS
SYSTOLIC BLOOD PRESSURE: 104 MMHG | DIASTOLIC BLOOD PRESSURE: 76 MMHG | HEIGHT: 60 IN | OXYGEN SATURATION: 98 % | HEART RATE: 83 BPM | BODY MASS INDEX: 36.52 KG/M2 | WEIGHT: 186 LBS

## 2023-10-05 DIAGNOSIS — L20.84 INTRINSIC ECZEMA: ICD-10-CM

## 2023-10-05 DIAGNOSIS — E03.9 ACQUIRED HYPOTHYROIDISM: Primary | ICD-10-CM

## 2023-10-05 DIAGNOSIS — K04.7 DENTAL ABSCESS: ICD-10-CM

## 2023-10-05 DIAGNOSIS — E78.49 OTHER HYPERLIPIDEMIA: ICD-10-CM

## 2023-10-05 DIAGNOSIS — E66.09 CLASS 2 OBESITY DUE TO EXCESS CALORIES WITHOUT SERIOUS COMORBIDITY WITH BODY MASS INDEX (BMI) OF 38.0 TO 38.9 IN ADULT: ICD-10-CM

## 2023-10-05 DIAGNOSIS — Z23 FLU VACCINE NEED: ICD-10-CM

## 2023-10-05 PROCEDURE — 99214 OFFICE O/P EST MOD 30 MIN: CPT | Mod: S$GLB,,, | Performed by: FAMILY MEDICINE

## 2023-10-05 PROCEDURE — 3078F DIAST BP <80 MM HG: CPT | Mod: CPTII,S$GLB,, | Performed by: FAMILY MEDICINE

## 2023-10-05 PROCEDURE — 3044F HG A1C LEVEL LT 7.0%: CPT | Mod: CPTII,S$GLB,, | Performed by: FAMILY MEDICINE

## 2023-10-05 PROCEDURE — 1159F MED LIST DOCD IN RCRD: CPT | Mod: CPTII,S$GLB,, | Performed by: FAMILY MEDICINE

## 2023-10-05 PROCEDURE — 3008F PR BODY MASS INDEX (BMI) DOCUMENTED: ICD-10-PCS | Mod: CPTII,S$GLB,, | Performed by: FAMILY MEDICINE

## 2023-10-05 PROCEDURE — G0008 ADMIN INFLUENZA VIRUS VAC: HCPCS | Mod: S$GLB,,, | Performed by: FAMILY MEDICINE

## 2023-10-05 PROCEDURE — 3008F BODY MASS INDEX DOCD: CPT | Mod: CPTII,S$GLB,, | Performed by: FAMILY MEDICINE

## 2023-10-05 PROCEDURE — G0008 FLU VACCINE (QUAD) GREATER THAN OR EQUAL TO 3YO PRESERVATIVE FREE IM: ICD-10-PCS | Mod: S$GLB,,, | Performed by: FAMILY MEDICINE

## 2023-10-05 PROCEDURE — 3074F SYST BP LT 130 MM HG: CPT | Mod: CPTII,S$GLB,, | Performed by: FAMILY MEDICINE

## 2023-10-05 PROCEDURE — 3044F PR MOST RECENT HEMOGLOBIN A1C LEVEL <7.0%: ICD-10-PCS | Mod: CPTII,S$GLB,, | Performed by: FAMILY MEDICINE

## 2023-10-05 PROCEDURE — 3074F PR MOST RECENT SYSTOLIC BLOOD PRESSURE < 130 MM HG: ICD-10-PCS | Mod: CPTII,S$GLB,, | Performed by: FAMILY MEDICINE

## 2023-10-05 PROCEDURE — 99214 PR OFFICE/OUTPT VISIT, EST, LEVL IV, 30-39 MIN: ICD-10-PCS | Mod: S$GLB,,, | Performed by: FAMILY MEDICINE

## 2023-10-05 PROCEDURE — 90686 IIV4 VACC NO PRSV 0.5 ML IM: CPT | Mod: S$GLB,,, | Performed by: FAMILY MEDICINE

## 2023-10-05 PROCEDURE — 1159F PR MEDICATION LIST DOCUMENTED IN MEDICAL RECORD: ICD-10-PCS | Mod: CPTII,S$GLB,, | Performed by: FAMILY MEDICINE

## 2023-10-05 PROCEDURE — 3078F PR MOST RECENT DIASTOLIC BLOOD PRESSURE < 80 MM HG: ICD-10-PCS | Mod: CPTII,S$GLB,, | Performed by: FAMILY MEDICINE

## 2023-10-05 PROCEDURE — 90686 FLU VACCINE (QUAD) GREATER THAN OR EQUAL TO 3YO PRESERVATIVE FREE IM: ICD-10-PCS | Mod: S$GLB,,, | Performed by: FAMILY MEDICINE

## 2023-10-05 RX ORDER — ROSUVASTATIN CALCIUM 20 MG/1
20 TABLET, COATED ORAL DAILY
Qty: 90 TABLET | Refills: 3 | Status: SHIPPED | OUTPATIENT
Start: 2023-10-05

## 2023-10-05 RX ORDER — AMOXICILLIN 500 MG/1
500 TABLET, FILM COATED ORAL 3 TIMES DAILY
COMMUNITY
Start: 2023-10-04

## 2023-10-05 NOTE — PROGRESS NOTES
SUBJECTIVE:    Patient ID: Catalina Lovett is a 43 y.o. female.    Chief Complaint: 7M Check Up; two ER visits / UC visit since last visit; on abx now, recent teeth extractions; and flu vaccine today    Patient with Down syndrome presents for her regular visit.  Her mother is accompanying her per usual.  She has had some recent issues with headache and nausea and vomiting.  She was seen in the ER twice with no significant findings however a few days after her visit she developed swelling to the right side of her face.  Found in urgent care to have a dental abscess.  She was placed on antibiotics.  She has been seen by her dentist on yesterday and had 3 teeth pulled.  She reports she is doing better.  She has had some weight loss secondary to decreased oral intake.     She has been stable on medications for hyperlipidemia and hypothyroidism.  She is up-to-date with her mammogram.  Declines cervical cancer screening as she is a sexual.          Past Medical History:   Diagnosis Date    Down's syndrome     Hyperlipidemia     Hypothyroidism      Past Surgical History:   Procedure Laterality Date    TEAR DUCT SURGERY       Family History   Problem Relation Age of Onset    Hypertension Mother     Diabetes Mother        Marital Status: Single  Alcohol History:  reports no history of alcohol use.  Tobacco History:  reports that she has never smoked. She has never used smokeless tobacco.  Drug History:  reports no history of drug use.    Review of patient's allergies indicates:   Allergen Reactions    Sulfa (sulfonamide antibiotics)        Current Outpatient Medications:     cholecalciferol, vitamin D3, (VITAMIN D3) 50 mcg (2,000 unit) Cap, Take 1 capsule by mouth Daily., Disp: , Rfl:     levothyroxine (SYNTHROID) 88 MCG tablet, Take 1 tablet (88 mcg total) by mouth once daily., Disp: 90 tablet, Rfl: 1    loratadine (CLARITIN) 10 mg tablet, Take 1 tablet by mouth Daily., Disp: , Rfl:     mupirocin (BACTROBAN) 2 %  ointment, Apply topically 2 (two) times daily., Disp: 30 g, Rfl: 3    triamcinolone (KENALOG) 0.5 % ointment, Apply 1 application topically 2 (two) times daily. For rash, Disp: 30 g, Rfl: 1    amoxicillin (AMOXIL) 500 MG Tab, Take 500 mg by mouth 3 (three) times daily., Disp: , Rfl:     rosuvastatin (CRESTOR) 20 MG tablet, Take 1 tablet (20 mg total) by mouth Daily., Disp: 90 tablet, Rfl: 3    Review of Systems   Constitutional:  Negative for activity change, fatigue and unexpected weight change.   HENT:  Positive for dental problem. Negative for hearing loss, postnasal drip, sinus pressure, sore throat and voice change.    Eyes:  Negative for photophobia and visual disturbance.   Respiratory:  Negative for cough, shortness of breath and wheezing.    Cardiovascular:  Negative for chest pain and palpitations.   Gastrointestinal:  Negative for constipation, diarrhea and nausea.   Genitourinary:  Negative for difficulty urinating, frequency, hematuria and urgency.   Musculoskeletal:  Negative for arthralgias and back pain.   Skin:  Negative for rash.   Neurological:  Negative for weakness, light-headedness and headaches.   Hematological:  Negative for adenopathy. Does not bruise/bleed easily.   Psychiatric/Behavioral:  The patient is not nervous/anxious.           Objective:      Vitals:    10/05/23 1603   BP: 104/76   Pulse: 83   SpO2: 98%   Weight: 84.4 kg (186 lb)   Height: 5' (1.524 m)     Physical Exam  Constitutional:       Appearance: Normal appearance.   HENT:      Head: Normocephalic and atraumatic.      Mouth/Throat:      Mouth: Mucous membranes are moist.   Eyes:      Conjunctiva/sclera: Conjunctivae normal.   Cardiovascular:      Rate and Rhythm: Normal rate.      Heart sounds: Murmur heard.   Pulmonary:      Effort: Pulmonary effort is normal.   Neurological:      General: No focal deficit present.      Mental Status: She is alert and oriented to person, place, and time.   Psychiatric:         Mood and  Affect: Mood normal.         Behavior: Behavior normal.           Admission on 09/05/2023, Discharged on 09/05/2023   Component Date Value Ref Range Status    WBC 09/05/2023 7.36  3.90 - 12.70 K/uL Final    RBC 09/05/2023 4.49  4.00 - 5.40 M/uL Final    Hemoglobin 09/05/2023 13.6  12.0 - 16.0 g/dL Final    Hematocrit 09/05/2023 41.1  37.0 - 48.5 % Final    MCV 09/05/2023 92  82 - 98 fL Final    MCH 09/05/2023 30.3  27.0 - 31.0 pg Final    MCHC 09/05/2023 33.1  32.0 - 36.0 g/dL Final    RDW 09/05/2023 14.8 (H)  11.5 - 14.5 % Final    Platelets 09/05/2023 196  150 - 450 K/uL Final    MPV 09/05/2023 9.5  9.2 - 12.9 fL Final    Immature Granulocytes 09/05/2023 0.4  0.0 - 0.5 % Final    Gran # (ANC) 09/05/2023 5.7  1.8 - 7.7 K/uL Final    Immature Grans (Abs) 09/05/2023 0.03  0.00 - 0.04 K/uL Final    Lymph # 09/05/2023 1.0  1.0 - 4.8 K/uL Final    Mono # 09/05/2023 0.6  0.3 - 1.0 K/uL Final    Eos # 09/05/2023 0.0  0.0 - 0.5 K/uL Final    Baso # 09/05/2023 0.05  0.00 - 0.20 K/uL Final    nRBC 09/05/2023 0  0 /100 WBC Final    Gran % 09/05/2023 77.0 (H)  38.0 - 73.0 % Final    Lymph % 09/05/2023 14.1 (L)  18.0 - 48.0 % Final    Mono % 09/05/2023 7.5  4.0 - 15.0 % Final    Eosinophil % 09/05/2023 0.3  0.0 - 8.0 % Final    Basophil % 09/05/2023 0.7  0.0 - 1.9 % Final    Differential Method 09/05/2023 Automated   Final    Sodium 09/05/2023 130 (L)  136 - 145 mmol/L Final    Potassium 09/05/2023 3.6  3.5 - 5.1 mmol/L Final    Chloride 09/05/2023 98  95 - 110 mmol/L Final    CO2 09/05/2023 24  23 - 29 mmol/L Final    Glucose 09/05/2023 100  70 - 110 mg/dL Final    BUN 09/05/2023 10  6 - 20 mg/dL Final    Creatinine 09/05/2023 0.9  0.5 - 1.4 mg/dL Final    Calcium 09/05/2023 8.8  8.7 - 10.5 mg/dL Final    Total Protein 09/05/2023 7.8  6.0 - 8.4 g/dL Final    Albumin 09/05/2023 3.5  3.5 - 5.2 g/dL Final    Total Bilirubin 09/05/2023 0.7  0.1 - 1.0 mg/dL Final    Alkaline Phosphatase 09/05/2023 82  55 - 135 U/L Final    AST  09/05/2023 24  10 - 40 U/L Final    ALT 09/05/2023 18  10 - 44 U/L Final    eGFR 09/05/2023 >60.0  >60 mL/min/1.73 m^2 Final    Anion Gap 09/05/2023 8  8 - 16 mmol/L Final    Lipase 09/05/2023 28  4 - 60 U/L Final    Specimen UA 09/05/2023 Urine, Clean Catch   Final    Color, UA 09/05/2023 Colorless (A)  Yellow, Straw, Brianna Final    Appearance, UA 09/05/2023 Clear  Clear Final    pH, UA 09/05/2023 6.0  5.0 - 8.0 Final    Specific Gravity, UA 09/05/2023 1.005  1.005 - 1.030 Final    Protein, UA 09/05/2023 Negative  Negative Final    Glucose, UA 09/05/2023 Negative  Negative Final    Ketones, UA 09/05/2023 Negative  Negative Final    Bilirubin (UA) 09/05/2023 Negative  Negative Final    Occult Blood UA 09/05/2023 Negative  Negative Final    Nitrite, UA 09/05/2023 Negative  Negative Final    Urobilinogen, UA 09/05/2023 Negative  Negative EU/dL Final    Leukocytes, UA 09/05/2023 Negative  Negative Final   Admission on 09/04/2023, Discharged on 09/05/2023   Component Date Value Ref Range Status    SARS-CoV-2 RNA, Amplification, Qual 09/05/2023 Negative  Negative Final    Influenza A, Molecular 09/05/2023 Negative  Negative Final    Influenza B, Molecular 09/05/2023 Negative  Negative Final    Flu A & B Source 09/05/2023 Nasal swab   Final    POC Preg Test, Ur 09/05/2023 Negative  Negative Final     Acceptable 09/05/2023 Yes   Final    Specimen UA 09/05/2023 Urine, Clean Catch   Final    Color, UA 09/05/2023 Colorless (A)  Yellow, Straw, Brianna Final    Appearance, UA 09/05/2023 Clear  Clear Final    pH, UA 09/05/2023 6.0  5.0 - 8.0 Final    Specific Gravity, UA 09/05/2023 1.005  1.005 - 1.030 Final    Protein, UA 09/05/2023 Negative  Negative Final    Glucose, UA 09/05/2023 Negative  Negative Final    Ketones, UA 09/05/2023 Negative  Negative Final    Bilirubin (UA) 09/05/2023 Negative  Negative Final    Occult Blood UA 09/05/2023 Negative  Negative Final    Nitrite, UA 09/05/2023 Negative  Negative Final     Urobilinogen, UA 09/05/2023 Negative  Negative EU/dL Final    Leukocytes, UA 09/05/2023 Negative  Negative Final    WBC 09/05/2023 5.93  3.90 - 12.70 K/uL Final    RBC 09/05/2023 4.75  4.00 - 5.40 M/uL Final    Hemoglobin 09/05/2023 14.4  12.0 - 16.0 g/dL Final    Hematocrit 09/05/2023 44.0  37.0 - 48.5 % Final    MCV 09/05/2023 93  82 - 98 fL Final    MCH 09/05/2023 30.3  27.0 - 31.0 pg Final    MCHC 09/05/2023 32.7  32.0 - 36.0 g/dL Final    RDW 09/05/2023 14.9 (H)  11.5 - 14.5 % Final    Platelets 09/05/2023 219  150 - 450 K/uL Final    MPV 09/05/2023 9.7  9.2 - 12.9 fL Final    Immature Granulocytes 09/05/2023 0.3  0.0 - 0.5 % Final    Gran # (ANC) 09/05/2023 3.9  1.8 - 7.7 K/uL Final    Immature Grans (Abs) 09/05/2023 0.02  0.00 - 0.04 K/uL Final    Lymph # 09/05/2023 1.5  1.0 - 4.8 K/uL Final    Mono # 09/05/2023 0.4  0.3 - 1.0 K/uL Final    Eos # 09/05/2023 0.0  0.0 - 0.5 K/uL Final    Baso # 09/05/2023 0.07  0.00 - 0.20 K/uL Final    nRBC 09/05/2023 0  0 /100 WBC Final    Gran % 09/05/2023 64.9  38.0 - 73.0 % Final    Lymph % 09/05/2023 26.0  18.0 - 48.0 % Final    Mono % 09/05/2023 7.3  4.0 - 15.0 % Final    Eosinophil % 09/05/2023 0.3  0.0 - 8.0 % Final    Basophil % 09/05/2023 1.2  0.0 - 1.9 % Final    Differential Method 09/05/2023 Automated   Final    Sodium 09/05/2023 133 (L)  136 - 145 mmol/L Final    Potassium 09/05/2023 3.8  3.5 - 5.1 mmol/L Final    Chloride 09/05/2023 102  95 - 110 mmol/L Final    CO2 09/05/2023 25  23 - 29 mmol/L Final    Glucose 09/05/2023 83  70 - 110 mg/dL Final    BUN 09/05/2023 11  6 - 20 mg/dL Final    Creatinine 09/05/2023 1.0  0.5 - 1.4 mg/dL Final    Calcium 09/05/2023 8.9  8.7 - 10.5 mg/dL Final    Total Protein 09/05/2023 8.5 (H)  6.0 - 8.4 g/dL Final    Albumin 09/05/2023 3.7  3.5 - 5.2 g/dL Final    Total Bilirubin 09/05/2023 0.6  0.1 - 1.0 mg/dL Final    Alkaline Phosphatase 09/05/2023 88  55 - 135 U/L Final    AST 09/05/2023 26  10 - 40 U/L Final    ALT  09/05/2023 19  10 - 44 U/L Final    eGFR 09/05/2023 >60.0  >60 mL/min/1.73 m^2 Final    Anion Gap 09/05/2023 6 (L)  8 - 16 mmol/L Final    BNP 09/05/2023 28  0 - 99 pg/mL Final    Troponin I High Sensitivity 09/05/2023 5.6  0.0 - 14.9 pg/mL Final        Assessment:       1. Acquired hypothyroidism    2. Other hyperlipidemia    3. Intrinsic eczema    4. Dental abscess    5. Class 2 obesity due to excess calories without serious comorbidity with body mass index (BMI) of 38.0 to 38.9 in adult    6. Flu vaccine need         Plan:       Acquired hypothyroidism  -     CBC Auto Differential; Future; Expected date: 10/06/2023  -     TSH; Future; Expected date: 10/06/2023    Other hyperlipidemia  -     rosuvastatin (CRESTOR) 20 MG tablet; Take 1 tablet (20 mg total) by mouth Daily.  Dispense: 90 tablet; Refill: 3  -     Lipid Panel; Future; Expected date: 10/06/2023  -     Comprehensive Metabolic Panel; Future; Expected date: 10/06/2023    Intrinsic eczema  Stable    Dental abscess  Receiving therapy    Class 2 obesity due to excess calories without serious comorbidity with body mass index (BMI) of 38.0 to 38.9 in adult  Continue to work on diet and activity    Flu vaccine need  -     Influenza - Quadrivalent *Preferred* (6 months+) (PF)      Follow up in about 6 months (around 4/5/2024) for thyroid.

## 2024-01-11 DIAGNOSIS — Z00.00 ENCOUNTER FOR MEDICARE ANNUAL WELLNESS EXAM: ICD-10-CM

## 2024-01-22 ENCOUNTER — TELEPHONE (OUTPATIENT)
Dept: FAMILY MEDICINE | Facility: CLINIC | Age: 44
End: 2024-01-22
Payer: MEDICARE

## 2024-01-22 NOTE — TELEPHONE ENCOUNTER
----- Message from Lorrie Mir sent at 1/22/2024  1:59 PM CST -----  Vm- 1:57-pt mom is calling to schedule a wellness visit for her   [phone]

## 2024-02-29 ENCOUNTER — OFFICE VISIT (OUTPATIENT)
Dept: FAMILY MEDICINE | Facility: CLINIC | Age: 44
End: 2024-02-29
Payer: MEDICARE

## 2024-02-29 VITALS
SYSTOLIC BLOOD PRESSURE: 103 MMHG | OXYGEN SATURATION: 99 % | HEART RATE: 80 BPM | WEIGHT: 185 LBS | DIASTOLIC BLOOD PRESSURE: 59 MMHG | BODY MASS INDEX: 36.32 KG/M2 | HEIGHT: 60 IN

## 2024-02-29 DIAGNOSIS — Q90.9 DOWN SYNDROME: ICD-10-CM

## 2024-02-29 DIAGNOSIS — E78.49 OTHER HYPERLIPIDEMIA: ICD-10-CM

## 2024-02-29 DIAGNOSIS — E03.9 ACQUIRED HYPOTHYROIDISM: Primary | ICD-10-CM

## 2024-02-29 DIAGNOSIS — L20.84 INTRINSIC ECZEMA: ICD-10-CM

## 2024-02-29 DIAGNOSIS — E55.9 VITAMIN D DEFICIENCY: ICD-10-CM

## 2024-02-29 PROCEDURE — 1160F RVW MEDS BY RX/DR IN RCRD: CPT | Mod: CPTII,S$GLB,, | Performed by: FAMILY MEDICINE

## 2024-02-29 PROCEDURE — 3074F SYST BP LT 130 MM HG: CPT | Mod: CPTII,S$GLB,, | Performed by: FAMILY MEDICINE

## 2024-02-29 PROCEDURE — 1159F MED LIST DOCD IN RCRD: CPT | Mod: CPTII,S$GLB,, | Performed by: FAMILY MEDICINE

## 2024-02-29 PROCEDURE — 3008F BODY MASS INDEX DOCD: CPT | Mod: CPTII,S$GLB,, | Performed by: FAMILY MEDICINE

## 2024-02-29 PROCEDURE — 3078F DIAST BP <80 MM HG: CPT | Mod: CPTII,S$GLB,, | Performed by: FAMILY MEDICINE

## 2024-02-29 PROCEDURE — 99214 OFFICE O/P EST MOD 30 MIN: CPT | Mod: S$GLB,,, | Performed by: FAMILY MEDICINE

## 2024-02-29 RX ORDER — LEVOTHYROXINE SODIUM 88 UG/1
88 TABLET ORAL DAILY
Qty: 90 TABLET | Refills: 1 | Status: SHIPPED | OUTPATIENT
Start: 2024-02-29 | End: 2024-03-25 | Stop reason: SDUPTHER

## 2024-02-29 NOTE — PROGRESS NOTES
SUBJECTIVE:    Patient ID: Catalina Lovett is a 43 y.o. female.    Chief Complaint: Follow-up (Wellness visit//No bottles//no refills //pt needs [employer] paperwork filled out )    Patient with hypothyroidism hyperlipidemia is taking her medications with no issues.  Her mother is present with her at visit.  She needs to get with [employer] to have a full wellness visit per Medicare guidelines.  Blood pressure looks good.  Weight is stable.  She has no complaints other than some occasional eczema flares.  Reports no urine symptoms or bowel symptoms.  No unusual aches and pains.      She has due a tetanus vaccine.  She is up-to-date with mammogram that has been normal.        Past Medical History:   Diagnosis Date    Down's syndrome     Hyperlipidemia     Hypothyroidism      Past Surgical History:   Procedure Laterality Date    TEAR DUCT SURGERY       Family History   Problem Relation Age of Onset    Hypertension Mother     Diabetes Mother        Marital Status: Single  Alcohol History:  reports no history of alcohol use.  Tobacco History:  reports that she has never smoked. She has never used smokeless tobacco.  Drug History:  reports no history of drug use.    Review of patient's allergies indicates:   Allergen Reactions    Sulfa (sulfonamide antibiotics)        Current Outpatient Medications:     cholecalciferol, vitamin D3, (VITAMIN D3) 50 mcg (2,000 unit) Cap, Take 1 capsule by mouth Daily., Disp: , Rfl:     loratadine (CLARITIN) 10 mg tablet, Take 1 tablet by mouth Daily., Disp: , Rfl:     mupirocin (BACTROBAN) 2 % ointment, Apply topically 2 (two) times daily., Disp: 30 g, Rfl: 3    rosuvastatin (CRESTOR) 20 MG tablet, Take 1 tablet (20 mg total) by mouth Daily., Disp: 90 tablet, Rfl: 3    triamcinolone (KENALOG) 0.5 % ointment, Apply 1 application topically 2 (two) times daily. For rash, Disp: 30 g, Rfl: 1    amoxicillin (AMOXIL) 500 MG Tab, Take 500 mg by mouth 3 (three) times daily., Disp: , Rfl:      levothyroxine (SYNTHROID) 88 MCG tablet, Take 1 tablet (88 mcg total) by mouth once daily., Disp: 90 tablet, Rfl: 1    Review of Systems   Constitutional:  Negative for activity change, fatigue and unexpected weight change.   HENT:  Negative for hearing loss, postnasal drip, sinus pressure, sore throat and voice change.    Eyes:  Negative for photophobia and visual disturbance.   Respiratory:  Negative for cough, shortness of breath and wheezing.    Cardiovascular:  Negative for chest pain and palpitations.   Gastrointestinal:  Negative for constipation, diarrhea and nausea.   Genitourinary:  Negative for difficulty urinating, frequency, hematuria and urgency.   Musculoskeletal:  Negative for arthralgias and back pain.   Skin:  Positive for rash.   Neurological:  Negative for weakness, light-headedness and headaches.   Hematological:  Negative for adenopathy. Does not bruise/bleed easily.   Psychiatric/Behavioral:  The patient is not nervous/anxious.           Objective:      Vitals:    02/29/24 1036   BP: (!) 103/59   Pulse: 80   SpO2: 99%   Weight: 83.9 kg (185 lb)   Height: 5' (1.524 m)     Physical Exam  Constitutional:       Appearance: She is obese.   HENT:      Head: Normocephalic and atraumatic.      Mouth/Throat:      Mouth: Mucous membranes are moist.   Eyes:      Conjunctiva/sclera: Conjunctivae normal.   Cardiovascular:      Rate and Rhythm: Normal rate.   Pulmonary:      Effort: Pulmonary effort is normal.   Musculoskeletal:      Right lower leg: No edema.      Left lower leg: Edema present.   Neurological:      General: No focal deficit present.      Mental Status: She is alert and oriented to person, place, and time.   Psychiatric:         Mood and Affect: Mood normal.         Behavior: Behavior normal.           Assessment:       1. Acquired hypothyroidism    2. Other hyperlipidemia    3. Vitamin D deficiency    4. Intrinsic eczema    5. Down syndrome         Plan:       Acquired hypothyroidism  -      levothyroxine (SYNTHROID) 88 MCG tablet; Take 1 tablet (88 mcg total) by mouth once daily.  Dispense: 90 tablet; Refill: 1    Other hyperlipidemia    Vitamin D deficiency    Intrinsic eczema    Down syndrome  Comments:  mother is her primary caregiver    Lab results pending    Follow up in about 6 months (around 8/29/2024) for cholesterol , thyroid .

## 2024-03-01 LAB
ALBUMIN SERPL-MCNC: 3.5 G/DL (ref 3.6–5.1)
ALBUMIN/GLOB SERPL: 0.9 (CALC) (ref 1–2.5)
ALP SERPL-CCNC: 91 U/L (ref 31–125)
ALT SERPL-CCNC: 12 U/L (ref 6–29)
AST SERPL-CCNC: 18 U/L (ref 10–30)
BASOPHILS # BLD AUTO: 72 CELLS/UL (ref 0–200)
BASOPHILS NFR BLD AUTO: 1.3 %
BILIRUB SERPL-MCNC: 0.4 MG/DL (ref 0.2–1.2)
BUN SERPL-MCNC: 11 MG/DL (ref 7–25)
BUN/CREAT SERPL: ABNORMAL (CALC) (ref 6–22)
CALCIUM SERPL-MCNC: 8.9 MG/DL (ref 8.6–10.2)
CHLORIDE SERPL-SCNC: 105 MMOL/L (ref 98–110)
CHOLEST SERPL-MCNC: 172 MG/DL
CHOLEST/HDLC SERPL: 3.7 (CALC)
CO2 SERPL-SCNC: 28 MMOL/L (ref 20–32)
CREAT SERPL-MCNC: 0.98 MG/DL (ref 0.5–0.99)
EGFR: 74 ML/MIN/1.73M2
EOSINOPHIL # BLD AUTO: 28 CELLS/UL (ref 15–500)
EOSINOPHIL NFR BLD AUTO: 0.5 %
ERYTHROCYTE [DISTWIDTH] IN BLOOD BY AUTOMATED COUNT: 14.7 % (ref 11–15)
GLOBULIN SER CALC-MCNC: 3.7 G/DL (CALC) (ref 1.9–3.7)
GLUCOSE SERPL-MCNC: 78 MG/DL (ref 65–99)
HCT VFR BLD AUTO: 41.7 % (ref 35–45)
HDLC SERPL-MCNC: 46 MG/DL
HGB BLD-MCNC: 13.7 G/DL (ref 11.7–15.5)
LDLC SERPL CALC-MCNC: 109 MG/DL (CALC)
LYMPHOCYTES # BLD AUTO: 1480 CELLS/UL (ref 850–3900)
LYMPHOCYTES NFR BLD AUTO: 26.9 %
MCH RBC QN AUTO: 29.9 PG (ref 27–33)
MCHC RBC AUTO-ENTMCNC: 32.9 G/DL (ref 32–36)
MCV RBC AUTO: 91 FL (ref 80–100)
MONOCYTES # BLD AUTO: 446 CELLS/UL (ref 200–950)
MONOCYTES NFR BLD AUTO: 8.1 %
NEUTROPHILS # BLD AUTO: 3476 CELLS/UL (ref 1500–7800)
NEUTROPHILS NFR BLD AUTO: 63.2 %
NONHDLC SERPL-MCNC: 126 MG/DL (CALC)
PLATELET # BLD AUTO: 222 THOUSAND/UL (ref 140–400)
PMV BLD REES-ECKER: 9.8 FL (ref 7.5–12.5)
POTASSIUM SERPL-SCNC: 4.4 MMOL/L (ref 3.5–5.3)
PROT SERPL-MCNC: 7.2 G/DL (ref 6.1–8.1)
RBC # BLD AUTO: 4.58 MILLION/UL (ref 3.8–5.1)
SODIUM SERPL-SCNC: 141 MMOL/L (ref 135–146)
TRIGL SERPL-MCNC: 82 MG/DL
TSH SERPL-ACNC: 0.99 MIU/L
WBC # BLD AUTO: 5.5 THOUSAND/UL (ref 3.8–10.8)

## 2024-03-25 ENCOUNTER — TELEPHONE (OUTPATIENT)
Dept: FAMILY MEDICINE | Facility: CLINIC | Age: 44
End: 2024-03-25
Payer: MEDICARE

## 2024-03-25 DIAGNOSIS — E03.9 ACQUIRED HYPOTHYROIDISM: ICD-10-CM

## 2024-03-25 RX ORDER — LEVOTHYROXINE SODIUM 88 UG/1
88 TABLET ORAL DAILY
Qty: 90 TABLET | Refills: 1 | Status: SHIPPED | OUTPATIENT
Start: 2024-03-25

## 2024-03-25 NOTE — TELEPHONE ENCOUNTER
----- Message from Nina Guthrie sent at 3/25/2024  2:37 PM CDT -----  Contact: Gloria Clementill for levothyroxine. [address] [address] @[phone]

## 2024-03-25 NOTE — TELEPHONE ENCOUNTER
Patient's mother said pharmacy will fill thyroid med if you will send today.  [address]

## 2024-03-25 NOTE — TELEPHONE ENCOUNTER
The patient's prescription has been approved and sent to   [id number] - [place], [place] - [address] AT [address]  [address] [address]  Phone: [phone] Fax: [phone]

## 2024-07-03 ENCOUNTER — TELEPHONE (OUTPATIENT)
Dept: FAMILY MEDICINE | Facility: CLINIC | Age: 44
End: 2024-07-03
Payer: MEDICARE

## 2024-07-03 NOTE — TELEPHONE ENCOUNTER
----- Message from Lorrie Mir sent at 7/3/2024 12:32 PM CDT -----  Pt mom is confused about what medication she is taking.  Gloria [phone]

## 2024-07-03 NOTE — TELEPHONE ENCOUNTER
Spoke with pt mother. States she found a old bottle of 100mcg levothyroxine. States she has a newer bottle of 88mcg. States she is not sure which medication she should be on.   Advised that Dr. Mariee D/c 100mcg last year. She voiced understanding. States she will give her 88mcg.

## 2024-08-07 ENCOUNTER — PATIENT MESSAGE (OUTPATIENT)
Dept: ADMINISTRATIVE | Facility: HOSPITAL | Age: 44
End: 2024-08-07
Payer: MEDICARE

## 2024-08-14 ENCOUNTER — TELEPHONE (OUTPATIENT)
Dept: FAMILY MEDICINE | Facility: CLINIC | Age: 44
End: 2024-08-14
Payer: MEDICARE

## 2024-08-14 DIAGNOSIS — E03.9 ACQUIRED HYPOTHYROIDISM: ICD-10-CM

## 2024-08-14 DIAGNOSIS — E55.9 VITAMIN D DEFICIENCY: ICD-10-CM

## 2024-08-14 DIAGNOSIS — E66.09 CLASS 2 OBESITY DUE TO EXCESS CALORIES WITHOUT SERIOUS COMORBIDITY WITH BODY MASS INDEX (BMI) OF 38.0 TO 38.9 IN ADULT: ICD-10-CM

## 2024-08-14 DIAGNOSIS — R73.9 HYPERGLYCEMIA: ICD-10-CM

## 2024-08-14 DIAGNOSIS — E78.49 OTHER HYPERLIPIDEMIA: Primary | ICD-10-CM

## 2024-08-15 NOTE — TELEPHONE ENCOUNTER
Spoke with patient notified fasting lab orders available at [facility].  Patient instructed to complete labs one week prior to visit.  Informed patient to return call with questions/concerns.  Patient verbalized understanding to all -DN

## 2024-08-29 ENCOUNTER — TELEPHONE (OUTPATIENT)
Dept: FAMILY MEDICINE | Facility: CLINIC | Age: 44
End: 2024-08-29
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
## 2024-08-29 NOTE — TELEPHONE ENCOUNTER
----- Message from Lorrie Mir sent at 8/29/2024  1:47 PM CDT -----  Vm- 1:45-mom is calling back   [phone]

## 2024-09-19 DIAGNOSIS — E03.9 ACQUIRED HYPOTHYROIDISM: ICD-10-CM

## 2024-09-19 RX ORDER — LEVOTHYROXINE SODIUM 88 UG/1
88 TABLET ORAL DAILY
Qty: 90 TABLET | Refills: 1 | Status: SHIPPED | OUTPATIENT
Start: 2024-09-19

## 2024-09-19 NOTE — TELEPHONE ENCOUNTER
prescription sent to   [id number] - [place] - [address] AT [facility] [address]  [address] [address]  Phone: [phone] Fax: [phone]

## 2024-09-19 NOTE — TELEPHONE ENCOUNTER
----- Message from Radha Fermin sent at 9/19/2024  2:11 PM CDT -----  Refill levothyroxine   [facility] [place]   [phone]

## 2024-09-23 ENCOUNTER — TELEPHONE (OUTPATIENT)
Dept: FAMILY MEDICINE | Facility: CLINIC | Age: 44
End: 2024-09-23
Payer: MEDICARE
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
## 2024-09-23 NOTE — TELEPHONE ENCOUNTER
----- Message from Steff Bello sent at 9/23/2024  2:54 PM CDT -----  Gloria  the patient's mom called. Please call with the patients lab results. Gloria's # [phone]

## 2024-11-08 DIAGNOSIS — E78.49 OTHER HYPERLIPIDEMIA: ICD-10-CM

## 2024-11-08 RX ORDER — ROSUVASTATIN CALCIUM 20 MG/1
20 TABLET, COATED ORAL DAILY
Qty: 90 TABLET | Refills: 3 | Status: SHIPPED | OUTPATIENT
Start: 2024-11-08

## 2024-11-08 NOTE — TELEPHONE ENCOUNTER
The patient's prescription has been approved and sent to   [id number] - [place], [place] - [address] AT [facility] [address]  [address] [address]  Phone: [phone] Fax: [phone]

## 2024-11-08 NOTE — TELEPHONE ENCOUNTER
----- Message from Radha sent at 11/8/2024 11:46 AM CST -----  Pt needs a refill on rosuvastatin   [facility] [address]   [phone]

## 2024-11-20 ENCOUNTER — TELEPHONE (OUTPATIENT)
Dept: FAMILY MEDICINE | Facility: CLINIC | Age: 44
End: 2024-11-20
Payer: MEDICARE

## 2024-11-20 NOTE — TELEPHONE ENCOUNTER
----- Message from Radha sent at 11/20/2024  2:56 PM CST -----  Pt needs to reschedule her appointment.   [phone]

## 2024-11-21 ENCOUNTER — CLINICAL SUPPORT (OUTPATIENT)
Dept: FAMILY MEDICINE | Facility: CLINIC | Age: 44
End: 2024-11-21
Payer: MEDICARE

## 2024-11-21 DIAGNOSIS — Z23 NEED FOR INFLUENZA VACCINATION: Primary | ICD-10-CM

## 2025-01-28 ENCOUNTER — OFFICE VISIT (OUTPATIENT)
Dept: FAMILY MEDICINE | Facility: CLINIC | Age: 45
End: 2025-01-28
Payer: MEDICARE

## 2025-01-28 VITALS
SYSTOLIC BLOOD PRESSURE: 104 MMHG | HEIGHT: 60 IN | WEIGHT: 188 LBS | OXYGEN SATURATION: 94 % | DIASTOLIC BLOOD PRESSURE: 74 MMHG | BODY MASS INDEX: 36.91 KG/M2 | HEART RATE: 83 BPM

## 2025-01-28 DIAGNOSIS — Q90.9 DOWN SYNDROME: ICD-10-CM

## 2025-01-28 DIAGNOSIS — E66.01 CLASS 2 SEVERE OBESITY DUE TO EXCESS CALORIES WITH SERIOUS COMORBIDITY AND BODY MASS INDEX (BMI) OF 36.0 TO 36.9 IN ADULT: ICD-10-CM

## 2025-01-28 DIAGNOSIS — L73.9 FOLLICULITIS: ICD-10-CM

## 2025-01-28 DIAGNOSIS — E66.812 CLASS 2 SEVERE OBESITY DUE TO EXCESS CALORIES WITH SERIOUS COMORBIDITY AND BODY MASS INDEX (BMI) OF 36.0 TO 36.9 IN ADULT: ICD-10-CM

## 2025-01-28 DIAGNOSIS — E78.49 OTHER HYPERLIPIDEMIA: Primary | ICD-10-CM

## 2025-01-28 DIAGNOSIS — Z23 NEED FOR TETANUS, DIPHTHERIA, AND ACELLULAR PERTUSSIS (TDAP) VACCINE: ICD-10-CM

## 2025-01-28 DIAGNOSIS — L20.84 INTRINSIC ECZEMA: ICD-10-CM

## 2025-01-28 DIAGNOSIS — E03.9 ACQUIRED HYPOTHYROIDISM: ICD-10-CM

## 2025-01-28 DIAGNOSIS — Z12.31 OTHER SCREENING MAMMOGRAM: ICD-10-CM

## 2025-01-28 PROCEDURE — 3008F BODY MASS INDEX DOCD: CPT | Mod: CPTII,S$GLB,, | Performed by: FAMILY MEDICINE

## 2025-01-28 PROCEDURE — 3074F SYST BP LT 130 MM HG: CPT | Mod: CPTII,S$GLB,, | Performed by: FAMILY MEDICINE

## 2025-01-28 PROCEDURE — 1159F MED LIST DOCD IN RCRD: CPT | Mod: CPTII,S$GLB,, | Performed by: FAMILY MEDICINE

## 2025-01-28 PROCEDURE — 99214 OFFICE O/P EST MOD 30 MIN: CPT | Mod: S$GLB,,, | Performed by: FAMILY MEDICINE

## 2025-01-28 PROCEDURE — 1160F RVW MEDS BY RX/DR IN RCRD: CPT | Mod: CPTII,S$GLB,, | Performed by: FAMILY MEDICINE

## 2025-01-28 PROCEDURE — 3078F DIAST BP <80 MM HG: CPT | Mod: CPTII,S$GLB,, | Performed by: FAMILY MEDICINE

## 2025-01-28 RX ORDER — TRIAMCINOLONE ACETONIDE 5 MG/G
1 OINTMENT TOPICAL 2 TIMES DAILY
Qty: 30 G | Refills: 1 | Status: SHIPPED | OUTPATIENT
Start: 2025-01-28

## 2025-01-28 RX ORDER — MUPIROCIN 20 MG/G
OINTMENT TOPICAL 2 TIMES DAILY
Qty: 30 G | Refills: 3 | Status: SHIPPED | OUTPATIENT
Start: 2025-01-28

## 2025-01-28 RX ORDER — LEVOTHYROXINE SODIUM 88 UG/1
88 TABLET ORAL DAILY
Qty: 90 TABLET | Refills: 1 | Status: SHIPPED | OUTPATIENT
Start: 2025-01-28

## 2025-01-28 NOTE — PROGRESS NOTES
SUBJECTIVE:   HPI: Catalina Lovett  is a 44 y.o. female who presents for regular visit   Regular Check Up    History of Present Illness    CHIEF COMPLAINT:  Patient presents today for follow up.  Her caretaker is here with her and helps provide history.  Continues to take her medications without any difficulty.  Immunizations are reviewed.  Some health screenings are due.  Patient reports no current problems.      LABS:  Labs from September showed normal vitamin D level, thyroid function, metabolic panel, and cholesterol values.         Telephone on 08/14/2024   Component Date Value Ref Range Status    Cholesterol 09/03/2024 143  <200 mg/dL Final    HDL 09/03/2024 43 (L)  > OR = 50 mg/dL Final    Triglycerides 09/03/2024 58  <150 mg/dL Final    LDL Cholesterol 09/03/2024 86  mg/dL (calc) Final    HDL/Cholesterol Ratio 09/03/2024 3.3  <5.0 (calc) Final    Non HDL Chol. (LDL+VLDL) 09/03/2024 100  <130 mg/dL (calc) Final    Glucose 09/03/2024 78  65 - 99 mg/dL Final    BUN 09/03/2024 12  7 - 25 mg/dL Final    Creatinine 09/03/2024 0.98  0.50 - 0.99 mg/dL Final    eGFR 09/03/2024 73  > OR = 60 mL/min/1.73m2 Final    BUN/Creatinine Ratio 09/03/2024 SEE NOTE:  6 - 22 (calc) Final    Sodium 09/03/2024 142  135 - 146 mmol/L Final    Potassium 09/03/2024 4.4  3.5 - 5.3 mmol/L Final    Chloride 09/03/2024 105  98 - 110 mmol/L Final    CO2 09/03/2024 32  20 - 32 mmol/L Final    Calcium 09/03/2024 8.1 (L)  8.6 - 10.2 mg/dL Final    Total Protein 09/03/2024 6.8  6.1 - 8.1 g/dL Final    Albumin 09/03/2024 3.4 (L)  3.6 - 5.1 g/dL Final    Globulin, Total 09/03/2024 3.4  1.9 - 3.7 g/dL (calc) Final    Albumin/Globulin Ratio 09/03/2024 1.0  1.0 - 2.5 (calc) Final    Total Bilirubin 09/03/2024 0.4  0.2 - 1.2 mg/dL Final    Alkaline Phosphatase 09/03/2024 87  31 - 125 U/L Final    AST 09/03/2024 22  10 - 30 U/L Final    ALT 09/03/2024 21  6 - 29 U/L Final    TSH 09/03/2024 3.41  mIU/L Final    Vitamin D, 25-OH, Total  09/03/2024 54  30 - 100 ng/mL Final      (Not in a hospital admission)    Review of patient's allergies indicates:   Allergen Reactions    Sulfa (sulfonamide antibiotics)      Current Outpatient Medications on File Prior to Visit   Medication Sig Dispense Refill    cholecalciferol, vitamin D3, (VITAMIN D3) 50 mcg (2,000 unit) Cap Take 1 capsule by mouth Daily.      loratadine (CLARITIN) 10 mg tablet Take 1 tablet by mouth Daily.      rosuvastatin (CRESTOR) 20 MG tablet Take 1 tablet (20 mg total) by mouth Daily. 90 tablet 3    [DISCONTINUED] levothyroxine (SYNTHROID) 88 MCG tablet Take 1 tablet (88 mcg total) by mouth once daily. 90 tablet 1    [DISCONTINUED] mupirocin (BACTROBAN) 2 % ointment Apply topically 2 (two) times daily. 30 g 3    [DISCONTINUED] triamcinolone (KENALOG) 0.5 % ointment Apply 1 application topically 2 (two) times daily. For rash 30 g 1    [DISCONTINUED] amoxicillin (AMOXIL) 500 MG Tab Take 500 mg by mouth 3 (three) times daily.       No current facility-administered medications on file prior to visit.     Past Medical History:   Diagnosis Date    Down's syndrome     Hyperlipidemia     Hypothyroidism      Past Surgical History:   Procedure Laterality Date    TEAR DUCT SURGERY       Family History   Problem Relation Name Age of Onset    Hypertension Mother      Diabetes Mother       Social History     Tobacco Use    Smoking status: Never    Smokeless tobacco: Never   Substance Use Topics    Alcohol use: Never    Drug use: Never      Health Maintenance Topics with due status: Not Due       Topic Last Completion Date    Hemoglobin A1c (Diabetic Prevention Screening) 04/03/2023    RSV Vaccine (Age 60+ and Pregnant patients) Not Due     Immunization History   Administered Date(s) Administered    COVID-19 MRNA, LN-S PF (MODERNA HALF 0.25 ML DOSE) 11/12/2021    COVID-19, MRNA, LN-S, PF (MODERNA FULL 0.5 ML DOSE) 01/18/2021, 02/19/2021    DT (Pediatric) 02/10/1995    Influenza 12/20/2013, 10/19/2015,  10/26/2016    Influenza (FLUBLOK) - Quadrivalent - Recombinant - PF *Preferred* (egg allergy) 10/29/2019, 09/26/2022    Influenza - Quadrivalent - PF *Preferred* (6 months and older) 10/11/2017, 09/22/2020, 09/23/2020, 10/21/2021, 10/25/2021, 10/05/2023    Influenza - Trivalent - Afluria, Fluzone MDV 10/30/2008, 10/09/2009, 11/05/2010    Influenza - Trivalent - Fluarix, Flulaval, Fluzone, Afluria - PF 12/20/2013, 10/19/2015, 10/26/2016, 11/21/2024    Influenza - Trivalent - Flublok - PF (18 years and older) 10/15/2018    Influenza Whole 11/02/1998    MMR 02/10/1995, 10/09/2009    PPD Test 08/17/1994    Td (ADULT) 10/30/2008       Review of Systems   Constitutional:  Negative for activity change, fatigue and unexpected weight change.   HENT:  Negative for hearing loss, postnasal drip, sinus pressure, sore throat and voice change.    Eyes:  Negative for photophobia and visual disturbance.   Respiratory:  Negative for cough, shortness of breath and wheezing.    Cardiovascular:  Negative for chest pain and palpitations.   Gastrointestinal:  Negative for constipation, diarrhea and nausea.   Genitourinary:  Negative for difficulty urinating, frequency, hematuria and urgency.   Musculoskeletal:  Negative for arthralgias and back pain.   Skin:  Negative for rash.   Neurological:  Negative for weakness, light-headedness and headaches.   Hematological:  Negative for adenopathy. Does not bruise/bleed easily.   Psychiatric/Behavioral:  The patient is not nervous/anxious.       OBJECTIVE:          2/29/2024    10:36 AM 1/28/2025     4:01 PM   Vitals - 1 value per visit   SYSTOLIC 103 104   DIASTOLIC 59 74   Pulse 80 83   SPO2 99 % 94 %   Weight (lb) 185 188   Weight (kg) 83.915 85.276   Height 5' (1.524 m) 5' (1.524 m)   BMI (Calculated) 36.1 36.7      Physical Exam  Constitutional:       Appearance: Normal appearance. She is obese.   HENT:      Head: Normocephalic and atraumatic.      Mouth/Throat:      Mouth: Mucous membranes  are moist.   Eyes:      Conjunctiva/sclera: Conjunctivae normal.   Cardiovascular:      Rate and Rhythm: Normal rate and regular rhythm.      Heart sounds: Normal heart sounds.   Pulmonary:      Effort: Pulmonary effort is normal.      Breath sounds: Normal breath sounds.   Neurological:      General: No focal deficit present.      Mental Status: She is alert and oriented to person, place, and time.   Psychiatric:         Mood and Affect: Mood normal.         Behavior: Behavior normal.          Assessment:       Assessment & Plan    IMPRESSION:  - Reviewed recent lab work from September, including vitamin D, thyroid, metabolic panel, and cholesterol; all values within acceptable range  - Assessed skin condition, noting improvement  - Evaluated current medication regimen for hypothyroidism and hyperlipidemia; determined no changes necessary  - Considered vaccination history, noting up-to-date status for flu and COVID-19  - Reviewed recent hospital visit for nausea and vomiting in September 2023    HYPERLIPIDEMIA  -continue rosuvastatin  -lipids in proper range     HYPOTHYROIDISM:  - Continued Levothyroxine 88 mcg for hypothyroidism.  - Reviewed recent lab work, which showed the patient's thyroid level was within normal range.    DERMATOLOGICAL ISSUES:  - Refilled Bactroban for finger-picking behavior.  - Refilled Triamcinolone (steroid cream) for dermatitis management.  - Observed that the patient's skin looks good, with no major issues on the hands.    MAMMOGRAM SCREENING:  - Discussed the importance of mammogram screening with the patient.  - Noted that the patient's last mammogram was in August 2023 and previous mammograms have been normal.  - Ordered a mammogram to be scheduled at the patient's convenience.    TETANUS VACCINATION:  - Explained the purpose of the tetanus vaccine, including protection against lockjaw and pertussis.  - Reviewed the patient's immunization record, noting that the last recorded tetanus  vaccine was in 2008.  - Ordered a tetanus vaccine to be administered at [facility].    IMMUNIZATIONS:  - Confirmed that the patient has received the flu vaccine for the current season and 3 COVID-19 vaccines.    MEDICATIONS/SUPPLEMENTS:  - Continued Eversulfastatin.  - Continued vitamin D supplement.    FOLLOW UP:  - Follow up in 6 months as per patient preference.            Plan:       Other hyperlipidemia    Acquired hypothyroidism  -     levothyroxine (SYNTHROID) 88 MCG tablet; Take 1 tablet (88 mcg total) by mouth once daily.  Dispense: 90 tablet; Refill: 1    Folliculitis  -     mupirocin (BACTROBAN) 2 % ointment; Apply topically 2 (two) times daily.  Dispense: 30 g; Refill: 3    Intrinsic eczema  -     triamcinolone (KENALOG) 0.5 % ointment; Apply 1 application  topically 2 (two) times daily. For rash  Dispense: 30 g; Refill: 1    Down syndrome    Class 2 severe obesity due to excess calories with serious comorbidity and body mass index (BMI) of 36.0 to 36.9 in adult  Comments:  darlyn improvement in BMI noted    Other screening mammogram  -     Mammo Digital Screening Bilat w/ [facility]; Future; Expected date: 01/28/2025    Need for tetanus, diphtheria, and acellular pertussis (Tdap) vaccine  -     DIPH,PERTUSS,ACEL,,TET VAC,PF, ADULT (ADACEL) 2 Lf-(2.5-5-3-5 mcg)-5Lf/0.5 mL Syrg; Inject 0.5 mLs into the muscle once. for 1 dose  Dispense: 0.5 mL; Refill: 0      Medication List with Changes/Refills   New Medications    DIPH,PERTUSS,ACEL,,TET VAC,PF, ADULT (ADACEL) 2 LF-(2.5-5-3-5 MCG)-5LF/0.5 ML SYRG    Inject 0.5 mLs into the muscle once. for 1 dose   Current Medications    CHOLECALCIFEROL, VITAMIN D3, (VITAMIN D3) 50 MCG (2,000 UNIT) CAP    Take 1 capsule by mouth Daily.    LORATADINE (CLARITIN) 10 MG TABLET    Take 1 tablet by mouth Daily.    ROSUVASTATIN (CRESTOR) 20 MG TABLET    Take 1 tablet (20 mg total) by mouth Daily.   Changed and/or Refilled Medications    Modified Medication Previous Medication     LEVOTHYROXINE (SYNTHROID) 88 MCG TABLET levothyroxine (SYNTHROID) 88 MCG tablet       Take 1 tablet (88 mcg total) by mouth once daily.    Take 1 tablet (88 mcg total) by mouth once daily.    MUPIROCIN (BACTROBAN) 2 % OINTMENT mupirocin (BACTROBAN) 2 % ointment       Apply topically 2 (two) times daily.    Apply topically 2 (two) times daily.    TRIAMCINOLONE (KENALOG) 0.5 % OINTMENT triamcinolone (KENALOG) 0.5 % ointment       Apply 1 application  topically 2 (two) times daily. For rash    Apply 1 application topically 2 (two) times daily. For rash   Discontinued Medications    AMOXICILLIN (AMOXIL) 500 MG TAB    Take 500 mg by mouth 3 (three) times daily.        Counseled on age and gender appropriate medical preventative services, including cancer screenings, immunizations, overall nutritional health, need for a consistent exercise regimen and an overall push towards maintaining a vigorous and active lifestyle.      Follow up in about 6 months (around 7/28/2025) for thyroid .        This note was generated with the assistance of ambient listening technology. Verbal consent was obtained by the patient and accompanying visitor(s) for the recording of patient appointment to facilitate this note. I attest to having reviewed and edited the generated note for accuracy, though some syntax or spelling errors may persist. Please contact the author of this note for any clarification.

## 2025-02-24 DIAGNOSIS — Z00.00 ENCOUNTER FOR MEDICARE ANNUAL WELLNESS EXAM: ICD-10-CM

## 2025-08-13 ENCOUNTER — TELEPHONE (OUTPATIENT)
Dept: FAMILY MEDICINE | Facility: CLINIC | Age: 45
End: 2025-08-13
Payer: MEDICARE

## 2025-08-13 DIAGNOSIS — E03.9 ACQUIRED HYPOTHYROIDISM: Primary | ICD-10-CM

## 2025-08-13 DIAGNOSIS — E78.49 OTHER HYPERLIPIDEMIA: ICD-10-CM

## 2025-08-13 DIAGNOSIS — Z79.899 ENCOUNTER FOR LONG-TERM (CURRENT) USE OF MEDICATIONS: ICD-10-CM

## 2025-08-14 ENCOUNTER — OFFICE VISIT (OUTPATIENT)
Dept: FAMILY MEDICINE | Facility: CLINIC | Age: 45
End: 2025-08-14
Payer: MEDICARE

## 2025-08-14 VITALS
OXYGEN SATURATION: 97 % | DIASTOLIC BLOOD PRESSURE: 61 MMHG | HEART RATE: 69 BPM | WEIGHT: 198 LBS | HEIGHT: 60 IN | SYSTOLIC BLOOD PRESSURE: 130 MMHG | BODY MASS INDEX: 38.87 KG/M2

## 2025-08-14 DIAGNOSIS — Z12.31 SCREENING MAMMOGRAM FOR BREAST CANCER: ICD-10-CM

## 2025-08-14 DIAGNOSIS — Q90.9 DOWN SYNDROME: ICD-10-CM

## 2025-08-14 DIAGNOSIS — E78.2 MIXED HYPERLIPIDEMIA: ICD-10-CM

## 2025-08-14 DIAGNOSIS — E03.9 ACQUIRED HYPOTHYROIDISM: Primary | ICD-10-CM

## 2025-08-14 DIAGNOSIS — J30.9 CHRONIC ALLERGIC RHINITIS: ICD-10-CM

## 2025-08-14 LAB
ALBUMIN SERPL-MCNC: 3.7 G/DL (ref 3.6–5.1)
ALBUMIN/GLOB SERPL: 1.1 (CALC) (ref 1–2.5)
ALP SERPL-CCNC: 79 U/L (ref 31–125)
ALT SERPL-CCNC: 12 U/L (ref 6–29)
APPEARANCE UR: CLEAR
AST SERPL-CCNC: 20 U/L (ref 10–30)
BACTERIA #/AREA URNS HPF: NORMAL /HPF
BACTERIA UR CULT: NORMAL
BASOPHILS # BLD AUTO: 73 CELLS/UL (ref 0–200)
BASOPHILS NFR BLD AUTO: 1.3 %
BILIRUB SERPL-MCNC: 0.4 MG/DL (ref 0.2–1.2)
BILIRUB UR QL STRIP: NEGATIVE
BUN SERPL-MCNC: 14 MG/DL (ref 7–25)
BUN/CREAT SERPL: NORMAL (CALC) (ref 6–22)
CALCIUM SERPL-MCNC: 8.8 MG/DL (ref 8.6–10.2)
CHLORIDE SERPL-SCNC: 104 MMOL/L (ref 98–110)
CHOLEST SERPL-MCNC: 152 MG/DL
CHOLEST/HDLC SERPL: 3.3 (CALC)
CO2 SERPL-SCNC: 29 MMOL/L (ref 20–32)
COLOR UR: YELLOW
CREAT SERPL-MCNC: 0.9 MG/DL (ref 0.5–0.99)
EGFR: 81 ML/MIN/1.73M2
EOSINOPHIL # BLD AUTO: 28 CELLS/UL (ref 15–500)
EOSINOPHIL NFR BLD AUTO: 0.5 %
ERYTHROCYTE [DISTWIDTH] IN BLOOD BY AUTOMATED COUNT: 15.3 % (ref 11–15)
GLOBULIN SER CALC-MCNC: 3.5 G/DL (CALC) (ref 1.9–3.7)
GLUCOSE SERPL-MCNC: 78 MG/DL (ref 65–99)
GLUCOSE UR QL STRIP: NEGATIVE
HCT VFR BLD AUTO: 44.3 % (ref 35–45)
HDLC SERPL-MCNC: 46 MG/DL
HGB BLD-MCNC: 13.9 G/DL (ref 11.7–15.5)
HGB UR QL STRIP: NEGATIVE
HYALINE CASTS #/AREA URNS LPF: NORMAL /LPF
KETONES UR QL STRIP: NEGATIVE
LDLC SERPL CALC-MCNC: 88 MG/DL (CALC)
LEUKOCYTE ESTERASE UR QL STRIP: NEGATIVE
LYMPHOCYTES # BLD AUTO: 1439 CELLS/UL (ref 850–3900)
LYMPHOCYTES NFR BLD AUTO: 25.7 %
MCH RBC QN AUTO: 30.5 PG (ref 27–33)
MCHC RBC AUTO-ENTMCNC: 31.4 G/DL (ref 32–36)
MCV RBC AUTO: 97.1 FL (ref 80–100)
MONOCYTES # BLD AUTO: 431 CELLS/UL (ref 200–950)
MONOCYTES NFR BLD AUTO: 7.7 %
NEUTROPHILS # BLD AUTO: 3629 CELLS/UL (ref 1500–7800)
NEUTROPHILS NFR BLD AUTO: 64.8 %
NITRITE UR QL STRIP: NEGATIVE
NONHDLC SERPL-MCNC: 106 MG/DL (CALC)
PH UR STRIP: 6.5 [PH] (ref 5–8)
PLATELET # BLD AUTO: 190 THOUSAND/UL (ref 140–400)
PMV BLD REES-ECKER: 9.6 FL (ref 7.5–12.5)
POTASSIUM SERPL-SCNC: 4.1 MMOL/L (ref 3.5–5.3)
PROT SERPL-MCNC: 7.2 G/DL (ref 6.1–8.1)
PROT UR QL STRIP: NEGATIVE
RBC # BLD AUTO: 4.56 MILLION/UL (ref 3.8–5.1)
RBC #/AREA URNS HPF: NORMAL /HPF
SERVICE CMNT-IMP: NORMAL
SODIUM SERPL-SCNC: 139 MMOL/L (ref 135–146)
SP GR UR STRIP: 1.01 (ref 1–1.03)
SQUAMOUS #/AREA URNS HPF: NORMAL /HPF
T4 FREE SERPL-MCNC: 1 NG/DL (ref 0.8–1.8)
TRIGL SERPL-MCNC: 86 MG/DL
TSH SERPL-ACNC: 8.76 MIU/L
WBC # BLD AUTO: 5.6 THOUSAND/UL (ref 3.8–10.8)
WBC #/AREA URNS HPF: NORMAL /HPF

## 2025-08-14 PROCEDURE — 3078F DIAST BP <80 MM HG: CPT | Mod: CPTII,S$GLB,, | Performed by: FAMILY MEDICINE

## 2025-08-14 PROCEDURE — 3075F SYST BP GE 130 - 139MM HG: CPT | Mod: CPTII,S$GLB,, | Performed by: FAMILY MEDICINE

## 2025-08-14 PROCEDURE — 1160F RVW MEDS BY RX/DR IN RCRD: CPT | Mod: CPTII,S$GLB,, | Performed by: FAMILY MEDICINE

## 2025-08-14 PROCEDURE — 99214 OFFICE O/P EST MOD 30 MIN: CPT | Mod: S$GLB,,, | Performed by: FAMILY MEDICINE

## 2025-08-14 PROCEDURE — 1159F MED LIST DOCD IN RCRD: CPT | Mod: CPTII,S$GLB,, | Performed by: FAMILY MEDICINE

## 2025-08-14 PROCEDURE — 3008F BODY MASS INDEX DOCD: CPT | Mod: CPTII,S$GLB,, | Performed by: FAMILY MEDICINE

## 2025-08-14 RX ORDER — ROSUVASTATIN CALCIUM 20 MG/1
20 TABLET, COATED ORAL DAILY
Qty: 90 TABLET | Refills: 3 | Status: SHIPPED | OUTPATIENT
Start: 2025-08-14

## 2025-08-14 RX ORDER — LEVOTHYROXINE SODIUM 88 UG/1
TABLET ORAL
Qty: 90 TABLET | Refills: 2 | Status: SHIPPED | OUTPATIENT
Start: 2025-08-14

## 2025-08-14 RX ORDER — LEVOTHYROXINE SODIUM 100 UG/1
TABLET ORAL
Qty: 30 TABLET | Refills: 2 | Status: SHIPPED | OUTPATIENT
Start: 2025-08-14